# Patient Record
Sex: MALE | Race: WHITE | HISPANIC OR LATINO | Employment: OTHER | URBAN - METROPOLITAN AREA
[De-identification: names, ages, dates, MRNs, and addresses within clinical notes are randomized per-mention and may not be internally consistent; named-entity substitution may affect disease eponyms.]

---

## 2017-03-15 DIAGNOSIS — E11.69 HYPERLIPIDEMIA ASSOCIATED WITH TYPE 2 DIABETES MELLITUS: Chronic | ICD-10-CM

## 2017-03-15 DIAGNOSIS — E78.5 HYPERLIPIDEMIA ASSOCIATED WITH TYPE 2 DIABETES MELLITUS: Chronic | ICD-10-CM

## 2017-03-15 RX ORDER — PRAVASTATIN SODIUM 40 MG/1
TABLET ORAL
Qty: 90 TABLET | Refills: 3 | Status: SHIPPED | OUTPATIENT
Start: 2017-03-15 | End: 2018-03-24 | Stop reason: SDUPTHER

## 2017-03-24 ENCOUNTER — OFFICE VISIT (OUTPATIENT)
Dept: OPHTHALMOLOGY | Facility: CLINIC | Age: 72
End: 2017-03-24
Attending: OPHTHALMOLOGY
Payer: MEDICARE

## 2017-03-24 DIAGNOSIS — H40.1233 LOW-TENSION GLAUCOMA, BILATERAL, SEVERE STAGE: Primary | ICD-10-CM

## 2017-03-24 DIAGNOSIS — Z96.1 PSEUDOPHAKIA: ICD-10-CM

## 2017-03-24 DIAGNOSIS — H40.1233 LOW-TENSION GLAUCOMA, BILATERAL, SEVERE STAGE: ICD-10-CM

## 2017-03-24 DIAGNOSIS — H26.493 PCO (POSTERIOR CAPSULAR OPACIFICATION), BILATERAL: ICD-10-CM

## 2017-03-24 DIAGNOSIS — H11.001 PTERYGIUM EYE, RIGHT: ICD-10-CM

## 2017-03-24 DIAGNOSIS — H53.413 SCOTOMA, CENTRAL, BILATERAL: ICD-10-CM

## 2017-03-24 DIAGNOSIS — E11.9 DIABETES MELLITUS TYPE 2 WITHOUT RETINOPATHY: ICD-10-CM

## 2017-03-24 PROCEDURE — 92014 COMPRE OPH EXAM EST PT 1/>: CPT | Mod: S$GLB,,, | Performed by: OPHTHALMOLOGY

## 2017-03-24 PROCEDURE — 92133 CPTRZD OPH DX IMG PST SGM ON: CPT | Mod: S$GLB,,, | Performed by: OPHTHALMOLOGY

## 2017-03-24 PROCEDURE — 99499 UNLISTED E&M SERVICE: CPT | Mod: S$GLB,,, | Performed by: OPHTHALMOLOGY

## 2017-03-24 PROCEDURE — 99999 PR PBB SHADOW E&M-EST. PATIENT-LVL II: CPT | Mod: PBBFAC,,, | Performed by: OPHTHALMOLOGY

## 2017-03-24 PROCEDURE — 92083 EXTENDED VISUAL FIELD XM: CPT | Mod: S$GLB,,, | Performed by: OPHTHALMOLOGY

## 2017-03-24 NOTE — PROGRESS NOTES
HPI     DLS: 11/18/16    Pt here for HVF review;  Pt states at times the corner of his OD will itch.     Meds: Latanoprost qhs ou    1. Pterygium eye, right  2. Low tension glaucoma, bilateral, severe stage  3. PCO (posterior capsular opacification), bilateral  4. Diabetes mellitus type 2 without retinopathy  5. Scotoma, central, bilateral  6. Pseudophakia, both eyes        Last edited by Donna Matson on 3/24/2017  9:52 AM.         Assessment /Plan     For exam results, see Encounter Report.    Low-tension glaucoma, bilateral, severe stage    Pterygium eye, right    PCO (posterior capsular opacification), bilateral    Diabetes mellitus type 2 without retinopathy    Scotoma, central, bilateral    Pseudophakia - Both Eyes      LOST TO F/U 1/2014 TO 1/2016 - 2 YEARS     Low Tension Glaucoma - severe stage ou  Begun on gtts 1999  First [id number]  First photos 1999           Family history    neg        Glaucoma meds    Latanoprost ou - ( use to use trusopt ou bid and alphagan)        H/O adverse rxn to glaucoma drops    Intol to timolol - heart palpitations        LASERS    ALT OD 9/15/2003        GLAUCOMA SURGERIES    none        OTHER EYE SURGERIES    PC IOL od 1/28/2009 // os 11/6/2009        CDR    0.8 w/ inf pit / 0.7 w/ inf notch (h/o sup disc heme os 8/22/2008)        Tbase    16-20 ou          Tmax    20/20            Ttarget    14/14             HVF    15 test 2000 to 2017 OD: sup alt defect, split fixation; OS: SAD with split fixation        Gonio    +3 ou        CCT    553/555        OCT   5 tests  2006 to 2017 - OD:dec I kokod T  // OS:dec I kokod T         HRT    6 test 2004 to 2016 - MR -  Dec. N/I, hannah S od // dec. I, kokod T os /// CDR 0.74 od // 0.71 os        Disc photos    1999, 1005 - slides // 2008, 210, 2012, 2016  - OIS    - Ttoday   12/12  - Test done today    IOP , HVF/OCT     2.  VF defects arcuate w/ split fixation ou - 2/2 #1    3.  PCO ou -stable    + decrease in VA with BAT ou - monitor - can  have yag cap prn     4.  PC IOL OU       OD 1/28/2009        OS 11/6/2008     5. Pterygium OS - causing  a FB sensation - AT's prn    Also small one od - ?? Previous excision     6. DM- no DR on exam    PLAN  Stable exam  Cont  latanoprost, - IOP just at target of 14 ou   If needed can add dorzolamide back or brimonidine back   Also is a candidate for slt - H/O ALT OD 2003 - ?? Any effect     Can have yag cap prn - pt is not bothered by vision at this time     cont AT's  PRN    F/U 4 months with IOP check- other tests up to date // AR/MR/BAT    I have seen and personally examined the patient.  I agree with the findings, assessment and plan of the resident and/or fellow.     Lona Grace MD

## 2017-05-18 ENCOUNTER — LAB VISIT (OUTPATIENT)
Dept: LAB | Facility: HOSPITAL | Age: 72
End: 2017-05-18
Attending: INTERNAL MEDICINE
Payer: MEDICARE

## 2017-05-18 DIAGNOSIS — E11.69 HYPERLIPIDEMIA ASSOCIATED WITH TYPE 2 DIABETES MELLITUS: ICD-10-CM

## 2017-05-18 DIAGNOSIS — E11.9 CONTROLLED TYPE 2 DIABETES MELLITUS WITHOUT COMPLICATION, WITHOUT LONG-TERM CURRENT USE OF INSULIN: ICD-10-CM

## 2017-05-18 DIAGNOSIS — E78.5 HYPERLIPIDEMIA ASSOCIATED WITH TYPE 2 DIABETES MELLITUS: ICD-10-CM

## 2017-05-18 LAB
ALBUMIN SERPL BCP-MCNC: 4 G/DL
ALP SERPL-CCNC: 95 U/L
ALT SERPL W/O P-5'-P-CCNC: 27 U/L
ANION GAP SERPL CALC-SCNC: 7 MMOL/L
AST SERPL-CCNC: 28 U/L
BILIRUB SERPL-MCNC: 0.7 MG/DL
BUN SERPL-MCNC: 11 MG/DL
CALCIUM SERPL-MCNC: 9.7 MG/DL
CHLORIDE SERPL-SCNC: 97 MMOL/L
CHOLEST/HDLC SERPL: 2.3 {RATIO}
CO2 SERPL-SCNC: 28 MMOL/L
CREAT SERPL-MCNC: 0.9 MG/DL
EST. GFR  (AFRICAN AMERICAN): >60 ML/MIN/1.73 M^2
EST. GFR  (NON AFRICAN AMERICAN): >60 ML/MIN/1.73 M^2
GLUCOSE SERPL-MCNC: 118 MG/DL
HDL/CHOLESTEROL RATIO: 43.3 %
HDLC SERPL-MCNC: 210 MG/DL
HDLC SERPL-MCNC: 91 MG/DL
LDLC SERPL CALC-MCNC: 110.6 MG/DL
NONHDLC SERPL-MCNC: 119 MG/DL
POTASSIUM SERPL-SCNC: 4.4 MMOL/L
PROT SERPL-MCNC: 8.2 G/DL
SODIUM SERPL-SCNC: 132 MMOL/L
TRIGL SERPL-MCNC: 42 MG/DL

## 2017-05-18 PROCEDURE — 80053 COMPREHEN METABOLIC PANEL: CPT

## 2017-05-18 PROCEDURE — 36415 COLL VENOUS BLD VENIPUNCTURE: CPT

## 2017-05-18 PROCEDURE — 83036 HEMOGLOBIN GLYCOSYLATED A1C: CPT

## 2017-05-18 PROCEDURE — 80061 LIPID PANEL: CPT

## 2017-05-19 LAB
ESTIMATED AVG GLUCOSE: 120 MG/DL
HBA1C MFR BLD HPLC: 5.8 %

## 2017-06-06 DIAGNOSIS — I15.2 HYPERTENSION ASSOCIATED WITH DIABETES: Chronic | ICD-10-CM

## 2017-06-06 DIAGNOSIS — E11.59 HYPERTENSION ASSOCIATED WITH DIABETES: Chronic | ICD-10-CM

## 2017-06-06 RX ORDER — BENAZEPRIL HYDROCHLORIDE 20 MG/1
20 TABLET ORAL DAILY
Qty: 90 TABLET | Refills: 1 | Status: SHIPPED | OUTPATIENT
Start: 2017-06-06 | End: 2017-12-04 | Stop reason: SDUPTHER

## 2017-06-09 ENCOUNTER — OFFICE VISIT (OUTPATIENT)
Dept: INTERNAL MEDICINE | Facility: CLINIC | Age: 72
End: 2017-06-09
Payer: MEDICARE

## 2017-06-09 VITALS
SYSTOLIC BLOOD PRESSURE: 116 MMHG | HEIGHT: 66 IN | DIASTOLIC BLOOD PRESSURE: 64 MMHG | WEIGHT: 159.38 LBS | HEART RATE: 82 BPM | BODY MASS INDEX: 25.61 KG/M2

## 2017-06-09 DIAGNOSIS — E78.5 HYPERLIPIDEMIA ASSOCIATED WITH TYPE 2 DIABETES MELLITUS: ICD-10-CM

## 2017-06-09 DIAGNOSIS — R25.2 MUSCLE CRAMPS: ICD-10-CM

## 2017-06-09 DIAGNOSIS — Z23 NEED FOR TDAP VACCINATION: ICD-10-CM

## 2017-06-09 DIAGNOSIS — D75.839 THROMBOCYTOSIS: ICD-10-CM

## 2017-06-09 DIAGNOSIS — Z00.00 ANNUAL PHYSICAL EXAM: Primary | ICD-10-CM

## 2017-06-09 DIAGNOSIS — I15.2 HYPERTENSION ASSOCIATED WITH DIABETES: ICD-10-CM

## 2017-06-09 DIAGNOSIS — E87.1 HYPONATREMIA: ICD-10-CM

## 2017-06-09 DIAGNOSIS — E11.69 CONTROLLED TYPE 2 DIABETES MELLITUS WITH OTHER SPECIFIED COMPLICATION, WITHOUT LONG-TERM CURRENT USE OF INSULIN: Chronic | ICD-10-CM

## 2017-06-09 DIAGNOSIS — E11.59 HYPERTENSION ASSOCIATED WITH DIABETES: ICD-10-CM

## 2017-06-09 DIAGNOSIS — E11.42 DIABETIC POLYNEUROPATHY ASSOCIATED WITH TYPE 2 DIABETES MELLITUS: Chronic | ICD-10-CM

## 2017-06-09 DIAGNOSIS — E11.69 HYPERLIPIDEMIA ASSOCIATED WITH TYPE 2 DIABETES MELLITUS: ICD-10-CM

## 2017-06-09 DIAGNOSIS — R35.0 URINARY FREQUENCY: ICD-10-CM

## 2017-06-09 DIAGNOSIS — F10.20 UNCOMPLICATED ALCOHOL DEPENDENCE: ICD-10-CM

## 2017-06-09 PROCEDURE — 99397 PER PM REEVAL EST PAT 65+ YR: CPT | Mod: S$GLB,,, | Performed by: INTERNAL MEDICINE

## 2017-06-09 PROCEDURE — 99999 PR PBB SHADOW E&M-EST. PATIENT-LVL III: CPT | Mod: PBBFAC,,, | Performed by: INTERNAL MEDICINE

## 2017-06-09 PROCEDURE — 99499 UNLISTED E&M SERVICE: CPT | Mod: S$GLB,,, | Performed by: INTERNAL MEDICINE

## 2017-06-09 RX ORDER — CHLORHEXIDINE GLUCONATE ORAL RINSE 1.2 MG/ML
SOLUTION DENTAL
COMMUNITY
Start: 2017-03-09 | End: 2022-04-20

## 2017-06-09 RX ORDER — LATANOPROST 50 UG/ML
SOLUTION/ DROPS OPHTHALMIC
COMMUNITY
Start: 2017-05-11 | End: 2017-10-16 | Stop reason: SDUPTHER

## 2017-06-09 NOTE — PROGRESS NOTES
Subjective:       Patient ID: Yong Harmon is a 71 y.o. male.    Chief Complaint: Annual Exam    HPI    Last visit with me 03/2016. Over last year seen by Ophthalmology. Gets up 3-4x/night to urinate. Every so often with muscle cramps as well, not every night.    Reviewed PMH, PSH, SH, FH, allergies, and medications.     Review of Systems   All other systems reviewed and are negative.      Objective:      Physical Exam   Constitutional: He is oriented to person, place, and time. No distress.   HENT:   Head: Atraumatic.   Right Ear: Tympanic membrane is scarred. Tympanic membrane is not injected, not perforated and not erythematous.   Left Ear: Tympanic membrane normal.   Mouth/Throat: Oropharynx is clear and moist. No oropharyngeal exudate.   Eyes: Pupils are equal, round, and reactive to light. Right eye exhibits no discharge. Left eye exhibits no discharge.   Neck: Normal range of motion. No thyromegaly present.   Cardiovascular: Normal rate, regular rhythm and normal heart sounds.    Pulses:       Dorsalis pedis pulses are 2+ on the right side, and 2+ on the left side.        Posterior tibial pulses are 2+ on the right side, and 2+ on the left side.   Pulmonary/Chest: Effort normal and breath sounds normal. He has no wheezes. He has no rales.   Abdominal: Soft. He exhibits no distension and no mass. There is no hepatosplenomegaly. There is no tenderness. There is no rigidity, no guarding and negative Webber's sign.   Genitourinary:   Genitourinary Comments: Recommended GIGI to evaluate for urinary frequency, patient defers.   Musculoskeletal: He exhibits no edema or tenderness.        Right foot: There is deformity (hammertoe of 5th digit). There is normal range of motion.        Left foot: There is deformity (hammertoe of 5th digit). There is normal range of motion.   Feet:   Right Foot:   Protective Sensation: 5 sites tested. 5 sites sensed.   Skin Integrity: Negative for ulcer or skin breakdown.   Left Foot:  "  Protective Sensation: 5 sites tested. 5 sites sensed.   Skin Integrity: Negative for ulcer or skin breakdown.   Lymphadenopathy:     He has no cervical adenopathy.   Neurological: He is alert and oriented to person, place, and time.   Skin: Skin is warm and dry. No rash noted.   Psychiatric: He has a normal mood and affect. His behavior is normal.   Nursing note and vitals reviewed.      Vitals:    06/09/17 1556   BP: 116/64   BP Location: Right arm   Patient Position: Sitting   BP Method: Manual   Pulse: 82   Weight: 72.3 kg (159 lb 6.3 oz)   Height: 5' 6" (1.676 m)     Body mass index is 25.73 kg/m².    RESULTS: Reviewed labs from last 12 months    The 10-year ASCVD risk score (Christine LARKIN Jr., et al., 2013) is: 29.3%    Values used to calculate the score:      Age: 71 years      Sex: Male      Is Non- : No      Diabetic: Yes      Tobacco smoker: No      Systolic Blood Pressure: 116 mmHg      Is BP treated: Yes      HDL Cholesterol: 91 mg/dL      Total Cholesterol: 210 mg/dL     Assessment:       1. Annual physical exam    2. Need for Tdap vaccination    3. Uncomplicated alcohol dependence    4. Thrombocytosis    5. Controlled type 2 diabetes mellitus with other specified complication, without long-term current use of insulin    6. Hypertension associated with diabetes    7. Diabetic polyneuropathy associated with type 2 diabetes mellitus    8. Hyperlipidemia associated with type 2 diabetes mellitus    9. Hyponatremia    10. Urinary frequency    11. Muscle cramps        Plan:   Yong was seen today for annual exam.    Diagnoses and all orders for this visit:    Annual physical exam:  Age-appropriate health screening reviewed, indicated tests ordered.     Need for Tdap vaccination  -     diphth,pertus,acell,,tetanus (BOOSTRIX) 2.5-8-5 Lf-mcg-Lf/0.5mL Susp; Inject 0.5 mLs into the muscle once.    Uncomplicated alcohol dependence:  Pt reports not interfering with work or with relationships.  No " evidence of hepatitis on labs.  Discussed risk of electrolytes fluctuations and liver inflammation with heavy alcohol use.    Thrombocytosis:  Seen on prior labs, has been stable, continue to monitor.  Likely reactive thrombocytosis, possibly due to alcohol intake.  -     CBC Without Differential; Future    Controlled type 2 diabetes mellitus with other specified complication, without long-term current use of insulin:  Prior diagnosis, well controlled on current management. No changes at this time, will continue to monitor.   -     Basic metabolic panel; Future  -     Hemoglobin A1c; Future    Hypertension associated with diabetes:  Prior diagnosis, well controlled on current management. No changes at this time, will continue to monitor.     Diabetic polyneuropathy associated with type 2 diabetes mellitus:  No absence of sensation today, continue to use footwear regularly.  Continue control of diabetes.    Hyperlipidemia associated with type 2 diabetes mellitus:  Prior diagnosis, sufficiently controlled on current management. No changes at this time, will continue to monitor.     Hyponatremia:  Likely elevated ADH secondary to alcohol use. Asymptomatic, continue to monitor.  -     Basic metabolic panel; Future    Urinary frequency:  Defers GIGI today. May be due to alcohol use. Check PSA with next test.  -     Prostate Specific Antigen, Diagnostic; Future    Muscle cramps:  Sporadic, not every night, check electrolytes.  -     Magnesium; Future    Return in about 6 months (around 12/9/2017) for fasting labs 1 week prior.  Naresh Verdin MD  Internal Medicine    Portions of this note were completed using [employer] dictation software. Please excuse typographical or syntax errors.

## 2017-06-27 DIAGNOSIS — I15.2 HYPERTENSION ASSOCIATED WITH DIABETES: ICD-10-CM

## 2017-06-27 DIAGNOSIS — E11.59 HYPERTENSION ASSOCIATED WITH DIABETES: ICD-10-CM

## 2017-06-27 RX ORDER — AMLODIPINE BESYLATE 10 MG/1
10 TABLET ORAL DAILY
Qty: 90 TABLET | Refills: 0 | Status: SHIPPED | OUTPATIENT
Start: 2017-06-27 | End: 2017-09-23 | Stop reason: SDUPTHER

## 2017-07-21 ENCOUNTER — OFFICE VISIT (OUTPATIENT)
Dept: OPHTHALMOLOGY | Facility: CLINIC | Age: 72
End: 2017-07-21
Payer: MEDICARE

## 2017-07-21 DIAGNOSIS — E11.9 DIABETES MELLITUS TYPE 2 WITHOUT RETINOPATHY: ICD-10-CM

## 2017-07-21 DIAGNOSIS — H53.413 SCOTOMA, CENTRAL, BILATERAL: ICD-10-CM

## 2017-07-21 DIAGNOSIS — H40.1233 LOW-TENSION GLAUCOMA, BILATERAL, SEVERE STAGE: Primary | ICD-10-CM

## 2017-07-21 DIAGNOSIS — H11.001 PTERYGIUM EYE, RIGHT: ICD-10-CM

## 2017-07-21 DIAGNOSIS — H26.493 PCO (POSTERIOR CAPSULAR OPACIFICATION), BILATERAL: ICD-10-CM

## 2017-07-21 DIAGNOSIS — Z96.1 PSEUDOPHAKIA: ICD-10-CM

## 2017-07-21 PROCEDURE — 99999 PR PBB SHADOW E&M-EST. PATIENT-LVL II: CPT | Mod: PBBFAC,,, | Performed by: OPHTHALMOLOGY

## 2017-07-21 PROCEDURE — 92012 INTRM OPH EXAM EST PATIENT: CPT | Mod: S$GLB,,, | Performed by: OPHTHALMOLOGY

## 2017-07-21 NOTE — PROGRESS NOTES
HPI     DLS: 3/24/17      Pt here for IOP check     Pt has no VA complaints     Meds: Xalatan OU QHS    1. Pterygium eye, right  2. Low tension glaucoma, bilateral, severe stage  3. PCO (posterior capsular opacification), bilateral  4. Diabetes mellitus type 2 without retinopathy  5. Scotoma, central, bilateral  6. Pseudophakia, both eyes     Last edited by Viviana Bean on 7/21/2017 10:26 AM. (History)            Assessment /Plan     For exam results, see Encounter Report.    Low-tension glaucoma, bilateral, severe stage    Pterygium eye, right    PCO (posterior capsular opacification), bilateral    Diabetes mellitus type 2 without retinopathy    Scotoma, central, bilateral    Pseudophakia - Both Eyes          LOST TO F/U 1/2014 TO 1/2016 - 2 YEARS     Low Tension Glaucoma - severe stage ou  Begun on gtts 1999  First [id number]  First photos 1999           Family history    neg        Glaucoma meds    Latanoprost ou - ( use to use trusopt ou bid and alphagan)        H/O adverse rxn to glaucoma drops    Intol to timolol - heart palpitations        LASERS    ALT OD 9/15/2003        GLAUCOMA SURGERIES    none        OTHER EYE SURGERIES    PC IOL od 1/28/2009 // os 11/6/2009        CDR    0.8 w/ inf pit / 0.7 w/ inf notch (h/o sup disc heme os 8/22/2008)        Tbase    16-20 ou          Tmax    20/20            Ttarget    14/14             HVF    15 test 2000 to 2017 OD: sup alt defect, split fixation; OS: SAD with split fixation        Gonio    +3 ou        CCT    553/555        OCT   5 tests  2006 to 2017 - OD:dec I hannah T  // OS:dec I hannah T         HRT    6 test 2004 to 2016 -  -  Dec. N/I, hannah S od // dec. I, hannah T os /// CDR 0.74 od // 0.71 os        Disc photos    1999, 1005 - slides // 2008, 210, 2012, 2016  - OIS    - Ttoday   13/14  - Test done today    IOP     2.  VF defects arcuate w/ split fixation ou - 2/2 #1    3.  PCO ou -stable    + decrease in VA with BAT ou - monitor - can have yag cap prn     4.   PC IOL OU       OD 1/28/2009        OS 11/6/2008     5. Pterygium OS - causing  a FB sensation - AT's prn    Also small one od - ?? Previous excision     6. DM- no DR on exam    PLAN  Stable exam  Cont  latanoprost, - IOP just at target of 14 ou   If needed can add dorzolamide back or brimonidine back   Also is a candidate for slt - H/O ALT OD 2003 - ?? Any effect     Can have yag cap prn - pt is not bothered by vision at this time - monitor     cont AT's  PRN    F/U 4 months with HRT / Gonio    I have seen and personally examined the patient.  I agree with the findings, assessment and plan of the resident and/or fellow.     Lona Grace MD

## 2017-09-23 DIAGNOSIS — E11.59 HYPERTENSION ASSOCIATED WITH DIABETES: ICD-10-CM

## 2017-09-23 DIAGNOSIS — I15.2 HYPERTENSION ASSOCIATED WITH DIABETES: ICD-10-CM

## 2017-09-25 RX ORDER — AMLODIPINE BESYLATE 10 MG/1
10 TABLET ORAL DAILY
Qty: 90 TABLET | Refills: 3 | Status: SHIPPED | OUTPATIENT
Start: 2017-09-25 | End: 2018-06-15 | Stop reason: SDUPTHER

## 2017-10-16 RX ORDER — LATANOPROST 50 UG/ML
SOLUTION/ DROPS OPHTHALMIC
Qty: 7.5 ML | Refills: 3 | Status: SHIPPED | OUTPATIENT
Start: 2017-10-16 | End: 2018-11-11 | Stop reason: SDUPTHER

## 2017-12-04 DIAGNOSIS — E11.59 HYPERTENSION ASSOCIATED WITH DIABETES: Chronic | ICD-10-CM

## 2017-12-04 DIAGNOSIS — I15.2 HYPERTENSION ASSOCIATED WITH DIABETES: Chronic | ICD-10-CM

## 2017-12-05 RX ORDER — BENAZEPRIL HYDROCHLORIDE 20 MG/1
20 TABLET ORAL DAILY
Qty: 90 TABLET | Refills: 3 | Status: SHIPPED | OUTPATIENT
Start: 2017-12-05 | End: 2018-11-27 | Stop reason: SDUPTHER

## 2018-02-26 ENCOUNTER — PES CALL (OUTPATIENT)
Dept: ADMINISTRATIVE | Facility: CLINIC | Age: 73
End: 2018-02-26

## 2018-03-02 ENCOUNTER — CLINICAL SUPPORT (OUTPATIENT)
Dept: OPHTHALMOLOGY | Facility: CLINIC | Age: 73
End: 2018-03-02
Payer: MEDICARE

## 2018-03-02 ENCOUNTER — OFFICE VISIT (OUTPATIENT)
Dept: OPHTHALMOLOGY | Facility: CLINIC | Age: 73
End: 2018-03-02
Payer: MEDICARE

## 2018-03-02 DIAGNOSIS — H53.413 SCOTOMA, CENTRAL, BILATERAL: ICD-10-CM

## 2018-03-02 DIAGNOSIS — Z96.1 PSEUDOPHAKIA: ICD-10-CM

## 2018-03-02 DIAGNOSIS — H40.1233 LOW-TENSION GLAUCOMA, BILATERAL, SEVERE STAGE: ICD-10-CM

## 2018-03-02 DIAGNOSIS — H11.001 PTERYGIUM EYE, RIGHT: ICD-10-CM

## 2018-03-02 DIAGNOSIS — H40.1233 LOW-TENSION GLAUCOMA, BILATERAL, SEVERE STAGE: Primary | ICD-10-CM

## 2018-03-02 DIAGNOSIS — E11.9 DIABETES MELLITUS TYPE 2 WITHOUT RETINOPATHY: ICD-10-CM

## 2018-03-02 DIAGNOSIS — H26.493 PCO (POSTERIOR CAPSULAR OPACIFICATION), BILATERAL: ICD-10-CM

## 2018-03-02 PROCEDURE — 92020 GONIOSCOPY: CPT | Mod: S$GLB,,, | Performed by: OPHTHALMOLOGY

## 2018-03-02 PROCEDURE — 92012 INTRM OPH EXAM EST PATIENT: CPT | Mod: S$GLB,,, | Performed by: OPHTHALMOLOGY

## 2018-03-02 PROCEDURE — 99999 PR PBB SHADOW E&M-EST. PATIENT-LVL II: CPT | Mod: PBBFAC,,, | Performed by: OPHTHALMOLOGY

## 2018-03-02 PROCEDURE — 92134 CPTRZ OPH DX IMG PST SGM RTA: CPT | Mod: S$GLB,,, | Performed by: OPHTHALMOLOGY

## 2018-03-02 PROCEDURE — 99499 UNLISTED E&M SERVICE: CPT | Mod: S$GLB,,, | Performed by: OPHTHALMOLOGY

## 2018-03-02 NOTE — PROGRESS NOTES
Assessment /Plan     For exam results, see Encounter Report.    Low-tension glaucoma, bilateral, severe stage    Pterygium eye, right    PCO (posterior capsular opacification), bilateral    Scotoma, central, bilateral    Diabetes mellitus type 2 without retinopathy    Pseudophakia - Both Eyes            LOST TO F/U 1/2014 TO 1/2016 - 2 YEARS     Low Tension Glaucoma - severe stage ou  Begun on gtts 1999  First [id number]  First photos 1999           Family history    neg        Glaucoma meds    Latanoprost ou - ( use to use trusopt ou bid and alphagan)        H/O adverse rxn to glaucoma drops    Intol to timolol - heart palpitations        LASERS    ALT OD 9/15/2003        GLAUCOMA SURGERIES    none        OTHER EYE SURGERIES    PC IOL od 1/28/2009 // os 11/6/2009        CDR    0.8 w/ inf pit / 0.7 w/ inf notch (h/o sup disc heme os 8/22/2008)        Tbase    16-20 ou          Tmax    20/20            Ttarget    14/14             HVF    15 test 2000 to 2017 OD: sup alt defect, split fixation; OS: SAD with split fixation        Gonio    +3 ou        CCT    553/555        OCT   5 tests  2006 to 2017 - OD:dec I bord T  // OS:dec I bord T         HRT    6 test 2004 to 2016 -  -  Dec. N/I, bord S od // dec. I, bord T os /// CDR 0.74 od // 0.71 os        Disc photos    1999, 1005 - slides // 2008, 210, 2012, 2016  - OIS    - Ttoday   12/12  - Test done today    IOP // HRT / PCO check     2.  VF defects arcuate w/ split fixation ou - 2/2 #1    3.  PCO ou - Vis sign os    + decrease in VA with BAT ou - monitor - can have yag cap prn     4.  PC IOL OU       OD 1/28/2009        OS 11/6/2008     5. Pterygium OS - causing  a FB sensation - AT's prn    Also small one od - ?? Previous excision     6. DM- no DR on exam    PLAN  Stable exam  Cont  latanoprost, - IOP just at target of 14 ou   If needed can add dorzolamide back or brimonidine back   Also is a candidate for slt - H/O ALT OD 2003 - ?? Any effect     Can have yag  cap prn - pt is not bothered by vision at this time - monitor     cont AT's  PRN    F/U yag cap os     I have seen and personally examined the patient.  I agree with the findings, assessment and plan of the resident and/or fellow.     Lona Grace MD

## 2018-03-22 ENCOUNTER — OFFICE VISIT (OUTPATIENT)
Dept: OPHTHALMOLOGY | Facility: CLINIC | Age: 73
End: 2018-03-22
Payer: MEDICARE

## 2018-03-22 DIAGNOSIS — H11.001 PTERYGIUM EYE, RIGHT: ICD-10-CM

## 2018-03-22 DIAGNOSIS — Z96.1 PSEUDOPHAKIA: ICD-10-CM

## 2018-03-22 DIAGNOSIS — H26.493 PCO (POSTERIOR CAPSULAR OPACIFICATION), BILATERAL: ICD-10-CM

## 2018-03-22 DIAGNOSIS — H26.492 PCO (POSTERIOR CAPSULAR OPACIFICATION), LEFT: Primary | ICD-10-CM

## 2018-03-22 DIAGNOSIS — H53.413 SCOTOMA, CENTRAL, BILATERAL: ICD-10-CM

## 2018-03-22 DIAGNOSIS — E11.9 DIABETES MELLITUS TYPE 2 WITHOUT RETINOPATHY: ICD-10-CM

## 2018-03-22 DIAGNOSIS — H40.1233 LOW-TENSION GLAUCOMA, BILATERAL, SEVERE STAGE: ICD-10-CM

## 2018-03-22 PROCEDURE — 66821 AFTER CATARACT LASER SURGERY: CPT | Mod: LT,S$GLB,, | Performed by: OPHTHALMOLOGY

## 2018-03-22 PROCEDURE — 99499 UNLISTED E&M SERVICE: CPT | Mod: S$GLB,,, | Performed by: OPHTHALMOLOGY

## 2018-03-22 PROCEDURE — 99999 PR PBB SHADOW E&M-EST. PATIENT-LVL II: CPT | Mod: PBBFAC,,, | Performed by: OPHTHALMOLOGY

## 2018-03-24 DIAGNOSIS — E11.69 HYPERLIPIDEMIA ASSOCIATED WITH TYPE 2 DIABETES MELLITUS: Chronic | ICD-10-CM

## 2018-03-24 DIAGNOSIS — E78.5 HYPERLIPIDEMIA ASSOCIATED WITH TYPE 2 DIABETES MELLITUS: Chronic | ICD-10-CM

## 2018-03-25 NOTE — PROGRESS NOTES
HPI     DLS: 3/02/18    Pt here for YAG CAP OS  Pt states vision is cloudy.     Meds: Latanoprost qhs ou    1. Pterygium eye, right   2. Low tension glaucoma, bilateral, severe stage   3. PCO (posterior capsular opacification), bilateral   4. Diabetes mellitus type 2 without retinopathy   5. Scotoma, central, bilateral   6. Pseudophakia, both eyes        Last edited by Donna Matson on 3/22/2018 10:01 AM. (History)            Assessment /Plan     For exam results, see Encounter Report.    PCO (posterior capsular opacification), left    PCO (posterior capsular opacification), bilateral    Low-tension glaucoma, bilateral, severe stage    Scotoma, central, bilateral    Pterygium eye, right    Diabetes mellitus type 2 without retinopathy    Pseudophakia - Both Eyes          LOST TO F/U 1/2014 TO 1/2016 - 2 YEARS     Low Tension Glaucoma - severe stage ou  Begun on gtts 1999  First [id number]  First photos 1999           Family history    neg        Glaucoma meds    Latanoprost ou - ( use to use trusopt ou bid and alphagan)        H/O adverse rxn to glaucoma drops    Intol to timolol - heart palpitations        LASERS    ALT OD 9/15/2003        GLAUCOMA SURGERIES    none        OTHER EYE SURGERIES    PC IOL od 1/28/2009 // os 11/6/2009        CDR    0.8 w/ inf pit / 0.7 w/ inf notch (h/o sup disc heme os 8/22/2008)        Tbase    16-20 ou          Tmax    20/20            Ttarget    14/14             HVF    15 test 2000 to 2017 OD: sup alt defect, split fixation; OS: SAD with split fixation        Gonio    +3 ou        CCT    553/555        OCT   5 tests  2006 to 2017 - OD:dec I bord T  // OS:dec I kokod T         HRT    6 test 2004 to 2016 - MR -  Dec. N/I, hannah S od // dec. I, hannah T os /// CDR 0.74 od // 0.71 os        Disc photos    1999, 1005 - slides // 2008, 210, 2012, 2016  - OIS    - Ttoday   12/12  - Test done today    yag cap os     2.  VF defects arcuate w/ split fixation ou - 2/2 #1    3.  PCO ou - Vis sign os  "   + decrease in VA with BAT ou - monitor - can have yag cap prn     4.  PC IOL OU       OD 1/28/2009        OS 11/6/2008     5. Pterygium OS - causing  a FB sensation - AT's prn    Also small one od - ?? Previous excision     6. DM- no DR on exam    PLAN  Stable exam  Cont  latanoprost, - IOP just at target of 14 ou   If needed can add dorzolamide back or brimonidine back   Also is a candidate for slt - H/O ALT OD 2003 - ?? Any effect     Can have yag cap os - done - 3/22/2018 - steroid taper    Minimal PCO od at this time - monitor    cont AT's  PRN    F/U - 6 weeks for  AR/MR - post yag cap os - and DFE - check to see if blurred vision improved    -  may have a flap of capsuLe "standing up" in the visual axis -  may need a touch up      I have seen and personally examined the patient.  I agree with the findings, assessment and plan of the resident and/or fellow.     Lona Grace MD    "

## 2018-03-26 RX ORDER — PRAVASTATIN SODIUM 40 MG/1
TABLET ORAL
Qty: 90 TABLET | Refills: 3 | Status: SHIPPED | OUTPATIENT
Start: 2018-03-26 | End: 2019-02-24 | Stop reason: SDUPTHER

## 2018-05-25 ENCOUNTER — OFFICE VISIT (OUTPATIENT)
Dept: OPHTHALMOLOGY | Facility: CLINIC | Age: 73
End: 2018-05-25
Payer: MEDICARE

## 2018-05-25 DIAGNOSIS — H40.1233 LOW-TENSION GLAUCOMA, BILATERAL, SEVERE STAGE: Primary | ICD-10-CM

## 2018-05-25 DIAGNOSIS — H26.492 PCO (POSTERIOR CAPSULAR OPACIFICATION), LEFT: ICD-10-CM

## 2018-05-25 DIAGNOSIS — H11.001 PTERYGIUM EYE, RIGHT: ICD-10-CM

## 2018-05-25 DIAGNOSIS — Z96.1 PSEUDOPHAKIA: ICD-10-CM

## 2018-05-25 DIAGNOSIS — H53.413 SCOTOMA, CENTRAL, BILATERAL: ICD-10-CM

## 2018-05-25 DIAGNOSIS — E11.9 DIABETES MELLITUS TYPE 2 WITHOUT RETINOPATHY: ICD-10-CM

## 2018-05-25 PROCEDURE — 99999 PR PBB SHADOW E&M-EST. PATIENT-LVL I: CPT | Mod: PBBFAC,,, | Performed by: OPHTHALMOLOGY

## 2018-05-25 PROCEDURE — 99499 UNLISTED E&M SERVICE: CPT | Mod: S$PBB,,, | Performed by: OPHTHALMOLOGY

## 2018-05-25 PROCEDURE — 92012 INTRM OPH EXAM EST PATIENT: CPT | Mod: S$GLB,,, | Performed by: OPHTHALMOLOGY

## 2018-05-25 NOTE — PROGRESS NOTES
Assessment /Plan     For exam results, see Encounter Report.    Low-tension glaucoma, bilateral, severe stage    Scotoma, central, bilateral    PCO (posterior capsular opacification), left    Pterygium eye, right    Diabetes mellitus type 2 without retinopathy    Pseudophakia - Both Eyes        LOST TO F/U 1/2014 TO 1/2016 - 2 YEARS     Low Tension Glaucoma - severe stage ou  Begun on gtts 1999  First [id number]  First photos 1999           Family history    neg        Glaucoma meds    Latanoprost ou - ( use to use trusopt ou bid and alphagan)        H/O adverse rxn to glaucoma drops    Intol to timolol - heart palpitations        LASERS    ALT OD 9/15/2003 // yag cap os 3/22/2018         GLAUCOMA SURGERIES    none        OTHER EYE SURGERIES    PC IOL od 1/28/2009 // os 11/6/2009        CDR    0.8 w/ inf pit / 0.7 w/ inf notch (h/o sup disc heme os 8/22/2008)        Tbase    16-20 ou          Tmax    20/20            Ttarget    14/14             HVF    15 test 2000 to 2017 OD: sup alt defect, split fixation; OS: SAD with split fixation        Gonio    +3 ou        CCT    553/555        OCT   5 tests  2006 to 2017 - OD:dec I bord T  // OS:dec I kokod T         HRT    6 test 2004 to 2016 - MR -  Dec. N/I, bord S od // dec. I, kokod T os /// CDR 0.74 od // 0.71 os        Disc photos    1999, 1005 - slides // 2008, 210, 2012, 2016  - OIS    - Ttoday   12/12  - Test done today    yag cap os - F/U - good VA - 20/20 post yag     2.  VF defects arcuate w/ split fixation ou - 2/2 #1    3.  PCO ou - Vis sign os    + decrease in VA with BAT ou -    S/P yag cap os 3/22/2018    No need for yag cap od yet - cont to monitor     4.  PC IOL OU       OD 1/28/2009        OS 11/6/2008     5. Pterygium OS - causing  a FB sensation - AT's prn    Also small one od - ?? Previous excision     6. DM- no DR on exam    PLAN  Stable exam  Cont  latanoprost, - IOP just at target of 14 ou   If needed can add dorzolamide back or brimonidine  back   Also is a candidate for slt - H/O ALT OD 2003 - ?? Any effect   S/P  yag cap os - done - 3/22/2018 - states vsion better // shadow gone     Minimal PCO od at this time - monitor    cont AT's  PRN    F/U - 4 months with HVF // DFE / phtos     I have seen and personally examined the patient.  I agree with the findings, assessment and plan of the resident and/or fellow.     Lona Grace MD

## 2018-05-31 DIAGNOSIS — E11.9 TYPE 2 DIABETES MELLITUS WITHOUT COMPLICATION: ICD-10-CM

## 2018-06-15 DIAGNOSIS — E11.59 HYPERTENSION ASSOCIATED WITH DIABETES: ICD-10-CM

## 2018-06-15 DIAGNOSIS — I15.2 HYPERTENSION ASSOCIATED WITH DIABETES: ICD-10-CM

## 2018-06-18 RX ORDER — AMLODIPINE BESYLATE 10 MG/1
10 TABLET ORAL DAILY
Qty: 90 TABLET | Refills: 0 | Status: SHIPPED | OUTPATIENT
Start: 2018-06-18 | End: 2019-01-31 | Stop reason: SDUPTHER

## 2018-08-17 ENCOUNTER — PATIENT MESSAGE (OUTPATIENT)
Dept: INTERNAL MEDICINE | Facility: CLINIC | Age: 73
End: 2018-08-17

## 2018-08-20 ENCOUNTER — PES CALL (OUTPATIENT)
Dept: ADMINISTRATIVE | Facility: CLINIC | Age: 73
End: 2018-08-20

## 2018-10-11 ENCOUNTER — LAB VISIT (OUTPATIENT)
Dept: LAB | Facility: HOSPITAL | Age: 73
End: 2018-10-11
Attending: INTERNAL MEDICINE
Payer: MEDICARE

## 2018-10-11 ENCOUNTER — PATIENT MESSAGE (OUTPATIENT)
Dept: INTERNAL MEDICINE | Facility: CLINIC | Age: 73
End: 2018-10-11

## 2018-10-11 ENCOUNTER — OFFICE VISIT (OUTPATIENT)
Dept: INTERNAL MEDICINE | Facility: CLINIC | Age: 73
End: 2018-10-11
Payer: MEDICARE

## 2018-10-11 VITALS
DIASTOLIC BLOOD PRESSURE: 68 MMHG | SYSTOLIC BLOOD PRESSURE: 130 MMHG | BODY MASS INDEX: 25.08 KG/M2 | WEIGHT: 156.06 LBS | HEIGHT: 66 IN | HEART RATE: 76 BPM

## 2018-10-11 DIAGNOSIS — E11.40 TYPE 2 DIABETES MELLITUS WITH DIABETIC NEUROPATHY, WITHOUT LONG-TERM CURRENT USE OF INSULIN: ICD-10-CM

## 2018-10-11 DIAGNOSIS — N18.2 STAGE 2 CHRONIC KIDNEY DISEASE: ICD-10-CM

## 2018-10-11 DIAGNOSIS — H40.1233 LOW-TENSION GLAUCOMA OF BOTH EYES, SEVERE STAGE: ICD-10-CM

## 2018-10-11 DIAGNOSIS — E11.21 CONTROLLED TYPE 2 DIABETES MELLITUS WITH DIABETIC NEPHROPATHY, WITHOUT LONG-TERM CURRENT USE OF INSULIN: Chronic | ICD-10-CM

## 2018-10-11 DIAGNOSIS — R25.2 MUSCLE CRAMPS: ICD-10-CM

## 2018-10-11 DIAGNOSIS — E78.5 HYPERLIPIDEMIA ASSOCIATED WITH TYPE 2 DIABETES MELLITUS: ICD-10-CM

## 2018-10-11 DIAGNOSIS — D75.839 THROMBOCYTOSIS: ICD-10-CM

## 2018-10-11 DIAGNOSIS — E11.69 CONTROLLED TYPE 2 DIABETES MELLITUS WITH OTHER SPECIFIED COMPLICATION, WITHOUT LONG-TERM CURRENT USE OF INSULIN: Chronic | ICD-10-CM

## 2018-10-11 DIAGNOSIS — E11.9 TYPE 2 DIABETES MELLITUS WITHOUT COMPLICATION: ICD-10-CM

## 2018-10-11 DIAGNOSIS — Z00.00 ENCOUNTER FOR PREVENTIVE HEALTH EXAMINATION: Primary | ICD-10-CM

## 2018-10-11 DIAGNOSIS — F10.20 UNCOMPLICATED ALCOHOL DEPENDENCE: ICD-10-CM

## 2018-10-11 DIAGNOSIS — E11.69 HYPERLIPIDEMIA ASSOCIATED WITH TYPE 2 DIABETES MELLITUS: ICD-10-CM

## 2018-10-11 DIAGNOSIS — E11.59 HYPERTENSION ASSOCIATED WITH DIABETES: ICD-10-CM

## 2018-10-11 DIAGNOSIS — E11.42 DIABETIC POLYNEUROPATHY ASSOCIATED WITH TYPE 2 DIABETES MELLITUS: Chronic | ICD-10-CM

## 2018-10-11 DIAGNOSIS — E87.1 HYPONATREMIA: ICD-10-CM

## 2018-10-11 DIAGNOSIS — I15.2 HYPERTENSION ASSOCIATED WITH DIABETES: ICD-10-CM

## 2018-10-11 DIAGNOSIS — R35.0 URINARY FREQUENCY: ICD-10-CM

## 2018-10-11 LAB
ANION GAP SERPL CALC-SCNC: 8 MMOL/L
BUN SERPL-MCNC: 6 MG/DL
CALCIUM SERPL-MCNC: 9.4 MG/DL
CHLORIDE SERPL-SCNC: 103 MMOL/L
CHOLEST SERPL-MCNC: 195 MG/DL
CHOLEST/HDLC SERPL: 2.1 {RATIO}
CO2 SERPL-SCNC: 25 MMOL/L
COMPLEXED PSA SERPL-MCNC: 2 NG/ML
CREAT SERPL-MCNC: 0.9 MG/DL
ERYTHROCYTE [DISTWIDTH] IN BLOOD BY AUTOMATED COUNT: 12.5 %
EST. GFR  (AFRICAN AMERICAN): >60 ML/MIN/1.73 M^2
EST. GFR  (NON AFRICAN AMERICAN): >60 ML/MIN/1.73 M^2
ESTIMATED AVG GLUCOSE: 108 MG/DL
GLUCOSE SERPL-MCNC: 109 MG/DL
HBA1C MFR BLD HPLC: 5.4 %
HCT VFR BLD AUTO: 41.6 %
HDLC SERPL-MCNC: 91 MG/DL
HDLC SERPL: 46.7 %
HGB BLD-MCNC: 14.3 G/DL
LDLC SERPL CALC-MCNC: 93.6 MG/DL
MAGNESIUM SERPL-MCNC: 2.4 MG/DL
MCH RBC QN AUTO: 31.7 PG
MCHC RBC AUTO-ENTMCNC: 34.4 G/DL
MCV RBC AUTO: 92 FL
NONHDLC SERPL-MCNC: 104 MG/DL
PLATELET # BLD AUTO: 425 K/UL
PMV BLD AUTO: 8.2 FL
POTASSIUM SERPL-SCNC: 4.1 MMOL/L
RBC # BLD AUTO: 4.51 M/UL
SODIUM SERPL-SCNC: 136 MMOL/L
TRIGL SERPL-MCNC: 52 MG/DL
WBC # BLD AUTO: 6.15 K/UL

## 2018-10-11 PROCEDURE — 3044F HG A1C LEVEL LT 7.0%: CPT | Mod: CPTII,S$GLB,, | Performed by: NURSE PRACTITIONER

## 2018-10-11 PROCEDURE — 99214 OFFICE O/P EST MOD 30 MIN: CPT | Mod: PBBFAC | Performed by: NURSE PRACTITIONER

## 2018-10-11 PROCEDURE — 80061 LIPID PANEL: CPT

## 2018-10-11 PROCEDURE — 36415 COLL VENOUS BLD VENIPUNCTURE: CPT

## 2018-10-11 PROCEDURE — 80048 BASIC METABOLIC PNL TOTAL CA: CPT

## 2018-10-11 PROCEDURE — 85027 COMPLETE CBC AUTOMATED: CPT

## 2018-10-11 PROCEDURE — 99499 UNLISTED E&M SERVICE: CPT | Mod: S$GLB,,, | Performed by: NURSE PRACTITIONER

## 2018-10-11 PROCEDURE — 84153 ASSAY OF PSA TOTAL: CPT

## 2018-10-11 PROCEDURE — 99999 PR PBB SHADOW E&M-EST. PATIENT-LVL IV: CPT | Mod: PBBFAC,,, | Performed by: NURSE PRACTITIONER

## 2018-10-11 PROCEDURE — G0439 PPPS, SUBSEQ VISIT: HCPCS | Mod: S$GLB,,, | Performed by: NURSE PRACTITIONER

## 2018-10-11 PROCEDURE — 3078F DIAST BP <80 MM HG: CPT | Mod: CPTII,S$GLB,, | Performed by: NURSE PRACTITIONER

## 2018-10-11 PROCEDURE — 83036 HEMOGLOBIN GLYCOSYLATED A1C: CPT

## 2018-10-11 PROCEDURE — 83735 ASSAY OF MAGNESIUM: CPT

## 2018-10-11 PROCEDURE — 3075F SYST BP GE 130 - 139MM HG: CPT | Mod: CPTII,S$GLB,, | Performed by: NURSE PRACTITIONER

## 2018-10-11 RX ORDER — PNV NO.95/FERROUS FUM/FOLIC AC 28MG-0.8MG
100 TABLET ORAL DAILY
COMMUNITY

## 2018-10-11 RX ORDER — AMOXICILLIN 500 MG
1 CAPSULE ORAL DAILY
COMMUNITY

## 2018-10-11 RX ORDER — CHOLECALCIFEROL (VITAMIN D3) 25 MCG
1000 TABLET ORAL DAILY
COMMUNITY
End: 2018-10-18 | Stop reason: SDUPTHER

## 2018-10-11 RX ORDER — FERROUS SULFATE 325(65) MG
325 TABLET ORAL EVERY OTHER DAY
COMMUNITY
End: 2018-10-18

## 2018-10-11 NOTE — PATIENT INSTRUCTIONS
Counseling and Referral of Other Preventative  (Italic type indicates deductible and co-insurance are waived)    Patient Name: Yong Harmon  Today's Date: 10/11/2018    Health Maintenance       Date Due Completion Date    Influenza Vaccine 11/29/2018 (Originally 8/1/2018) 10/5/2016 (Declined)    Override on 10/5/2016: Declined    TETANUS VACCINE 10/11/2019 (Originally 9/21/1963) Need to obtain    Hemoglobin A1c 04/11/2019 10/11/2018 (Done)    Override on 10/11/2018: Done    Low Dose Statin 05/25/2019 5/25/2018    Eye Exam 05/25/2019 5/25/2018    Foot Exam 10/11/2019 10/11/2018 (Done)    Override on 10/11/2018: Done    Lipid Panel 10/11/2019 10/11/2018 (Done)    Override on 10/11/2018: Done    Colonoscopy 12/10/2024 12/10/2014        No orders of the defined types were placed in this encounter.    The following information is provided to all patients.  This information is to help you find resources for any of the problems found today that may be affecting your health:                Living healthy guide: www.[facility].[place].gov      Understanding Diabetes: www.diabetes.org      Eating healthy: www.cdc.gov/healthyweight      CDC home safety checklist: www.cdc.gov/steadi/patient.html      Agency on Aging: www.goea.[place].[facility]      Alcoholics anonymous (AA): www.aa.org      Physical Activity: www.devi.nih.gov/bk5ysdw      Tobacco use: www.quitwithusla.org

## 2018-10-11 NOTE — PROGRESS NOTES
"Yong Harmon presented for a  Medicare AWV and comprehensive Health Risk Assessment today. The following components were reviewed and updated:    · Medical history  · Family History  · Social history  · Allergies and Current Medications  · Health Risk Assessment  · Health Maintenance  · Care Team     ** See Completed Assessments for Annual Wellness Visit within the encounter summary.**       The following assessments were completed:  · Living Situation  · CAGE  · Depression Screening  · Timed Get Up and Go  · Whisper Test  · Cognitive Function Screening  ·   ·   ·   · Nutrition Screening  · ADL Screening  · PAQ Screening    Vitals:    10/11/18 0900   BP: 130/68   BP Location: Left arm   Pulse: 76   Weight: 70.8 kg (156 lb 1.4 oz)   Height: 5' 6" (1.676 m)     Body mass index is 25.19 kg/m².  Physical Exam   Constitutional: He is oriented to person, place, and time. He appears well-developed and well-nourished.   HENT:   Head: Normocephalic.   Cardiovascular: Normal rate, regular rhythm and intact distal pulses.   Pulses:       Dorsalis pedis pulses are 3+ on the right side, and 3+ on the left side.        Posterior tibial pulses are 2+ on the right side, and 2+ on the left side.   Pulmonary/Chest: Effort normal and breath sounds normal.   Abdominal: Soft. Bowel sounds are normal. He exhibits no mass.   Musculoskeletal: Normal range of motion. He exhibits no edema.        Right foot: There is normal range of motion and no deformity.        Left foot: There is normal range of motion and no deformity.   Feet:   Right Foot:   Protective Sensation: 7 sites tested. 7 sites sensed.   Skin Integrity: Negative for ulcer, blister, skin breakdown, erythema, warmth, callus or dry skin.   Left Foot:   Protective Sensation: 7 sites tested. 7 sites sensed.   Skin Integrity: Negative for ulcer, blister, skin breakdown, erythema, warmth or dry skin.   Neurological: He is alert and oriented to person, place, and time.   Skin: Skin is " warm and dry.   Psychiatric: He has a normal mood and affect.   Nursing note and vitals reviewed.        Diagnoses and health risks identified today and associated recommendations/orders:    1. Encounter for preventive health examination  Here for Health Risk Assessment/Annual Wellness Visit.  Health maintenance reviewed and updated. Follow up in one year.  Declined influenza vaccination. Reminder for TDAP given.    2. Hypertension associated with diabetes  Chronic, stable on current medications. Followed by PCP.    3. Hyperlipidemia associated with type 2 diabetes mellitus  Chronic, stable on current medications. Followed by PCP.    4. Controlled type 2 diabetes mellitus with diabetic nephropathy, without long-term current use of insulin  Chronic, stable on current medication. Followed by PCP.    5. Stage 2 chronic kidney disease  Chronic, mild, stable. GFR 84.  Followed by PCP.    6. Type 2 diabetes mellitus with diabetic neuropathy, without long-term current use of insulin  Chronic, stable on current medication. Followed by PCP.    7. Diabetic polyneuropathy associated with type 2 diabetes mellitus  Chronic, stable on current medication. Followed by PCP.    8. Thrombocytosis  Chronic, stable. Followed by PCP.    9. Uncomplicated alcohol dependence  Chronic, reports he consumes 5 lite beers daily. CAGE score 2/4. Followed by PCP.    10. Low-tension glaucoma of both eyes, severe stage  Chronic, stable on current medications. Followed by Ophthalmology      Provided Yong with a 5-10 year written screening schedule and personal prevention plan. Recommendations were developed using the USPSTF age appropriate recommendations. Education, counseling, and referrals were provided as needed. After Visit Summary printed and given to patient which includes a list of additional screenings\tests needed.    Follow-up in 7 days (on 10/18/2018).with PCP    Estela Gill NP

## 2018-10-18 ENCOUNTER — OFFICE VISIT (OUTPATIENT)
Dept: INTERNAL MEDICINE | Facility: CLINIC | Age: 73
End: 2018-10-18
Payer: MEDICARE

## 2018-10-18 VITALS
HEART RATE: 76 BPM | WEIGHT: 156.5 LBS | DIASTOLIC BLOOD PRESSURE: 62 MMHG | BODY MASS INDEX: 25.15 KG/M2 | HEIGHT: 66 IN | SYSTOLIC BLOOD PRESSURE: 116 MMHG

## 2018-10-18 DIAGNOSIS — E11.59 HYPERTENSION ASSOCIATED WITH DIABETES: ICD-10-CM

## 2018-10-18 DIAGNOSIS — E11.69 HYPERLIPIDEMIA ASSOCIATED WITH TYPE 2 DIABETES MELLITUS: ICD-10-CM

## 2018-10-18 DIAGNOSIS — E11.21 CONTROLLED TYPE 2 DIABETES MELLITUS WITH DIABETIC NEPHROPATHY, WITHOUT LONG-TERM CURRENT USE OF INSULIN: Chronic | ICD-10-CM

## 2018-10-18 DIAGNOSIS — E78.5 HYPERLIPIDEMIA ASSOCIATED WITH TYPE 2 DIABETES MELLITUS: ICD-10-CM

## 2018-10-18 DIAGNOSIS — Z00.00 ANNUAL PHYSICAL EXAM: Primary | ICD-10-CM

## 2018-10-18 DIAGNOSIS — I15.2 HYPERTENSION ASSOCIATED WITH DIABETES: ICD-10-CM

## 2018-10-18 PROCEDURE — 99213 OFFICE O/P EST LOW 20 MIN: CPT | Mod: PBBFAC | Performed by: INTERNAL MEDICINE

## 2018-10-18 PROCEDURE — 99499 UNLISTED E&M SERVICE: CPT | Mod: S$GLB,,, | Performed by: INTERNAL MEDICINE

## 2018-10-18 PROCEDURE — 3074F SYST BP LT 130 MM HG: CPT | Mod: CPTII,,, | Performed by: INTERNAL MEDICINE

## 2018-10-18 PROCEDURE — 99999 PR PBB SHADOW E&M-EST. PATIENT-LVL III: CPT | Mod: PBBFAC,,, | Performed by: INTERNAL MEDICINE

## 2018-10-18 PROCEDURE — 99397 PER PM REEVAL EST PAT 65+ YR: CPT | Mod: S$PBB,,, | Performed by: INTERNAL MEDICINE

## 2018-10-18 PROCEDURE — 3078F DIAST BP <80 MM HG: CPT | Mod: CPTII,,, | Performed by: INTERNAL MEDICINE

## 2018-10-18 PROCEDURE — 3044F HG A1C LEVEL LT 7.0%: CPT | Mod: CPTII,,, | Performed by: INTERNAL MEDICINE

## 2018-10-18 NOTE — PROGRESS NOTES
"Subjective:       Patient ID: Yong Harmon is a 73 y.o. male.    Chief Complaint: Annual Exam    HPI    Last visit with me 1 year ago.  Since then seen by Ophthalmology. Pt in his usual state of health, denies any problems or issues.    Reviewed PMH, PSH, SH, FH, allergies, and medications.     Review of Systems   All other systems reviewed and are negative.      Objective:      Physical Exam   Constitutional: No distress.   HENT:   Head: Atraumatic.   Right Ear: Tympanic membrane normal. No tenderness.   Left Ear: Tympanic membrane normal. No tenderness.   Mouth/Throat: Oropharynx is clear and moist. No oropharyngeal exudate.   Eyes: Pupils are equal, round, and reactive to light. Right eye exhibits no discharge. Left eye exhibits no discharge.   Neck: Normal range of motion. No thyromegaly present.   Cardiovascular: Normal rate, regular rhythm and normal heart sounds.   Pulmonary/Chest: Effort normal and breath sounds normal. No stridor. He has no wheezes. He has no rales.   Abdominal: Soft. There is no tenderness. There is no guarding.   Musculoskeletal: He exhibits no edema or tenderness.   Lymphadenopathy:     He has no cervical adenopathy.   Neurological: He is alert. He displays no tremor.   Skin: Skin is warm and dry. No rash noted.   Psychiatric: He has a normal mood and affect. His behavior is normal.   Nursing note and vitals reviewed.      Vitals:    10/18/18 1114   BP: 116/62   BP Location: Right arm   Patient Position: Sitting   BP Method: Large (Manual)   Pulse: 76   Weight: 71 kg (156 lb 8.4 oz)   Height: 5' 6" (1.676 m)     Body mass index is 25.26 kg/m².    RESULTS: Reviewed labs from last 12 months    Assessment:       1. Annual physical exam    2. Hyperlipidemia associated with type 2 diabetes mellitus    3. Hypertension associated with diabetes    4. Controlled type 2 diabetes mellitus with diabetic nephropathy, without long-term current use of insulin        Plan:   Yong was seen today for " annual exam.    Diagnoses and all orders for this visit:    Annual physical exam:  Age-appropriate health screening reviewed, indicated tests ordered.   -     Lipid panel; Future  -     Comprehensive metabolic panel; Future  -     CBC Without Differential; Future  -     Hemoglobin A1c; Future    Hyperlipidemia associated with type 2 diabetes mellitus:  Prior diagnosis, stable, well controlled on current management. No changes at this time, will continue to monitor.   -     Lipid panel; Future    Hypertension associated with diabetes:  Prior diagnosis, stable, well controlled on current management. No changes at this time, will continue to monitor.   -     Comprehensive metabolic panel; Future  -     CBC Without Differential; Future    Controlled type 2 diabetes mellitus with diabetic nephropathy, without long-term current use of insulin:  Prior diagnosis, stable, well controlled on current management. No changes at this time, will continue to monitor.   -     Hemoglobin A1c; Future    Follow-up in about 1 year (around 10/18/2019) for EPP annual exam, fasting labs 1 week prior.  Naresh Verdin MD  Internal Medicine    Portions of this note were completed using medical dictation software. Please excuse typographical or syntax errors that were missed on review.

## 2018-11-12 RX ORDER — LATANOPROST 50 UG/ML
SOLUTION/ DROPS OPHTHALMIC
Qty: 7.5 ML | Refills: 3 | Status: SHIPPED | OUTPATIENT
Start: 2018-11-12 | End: 2020-03-06 | Stop reason: SDUPTHER

## 2018-11-27 DIAGNOSIS — E11.59 HYPERTENSION ASSOCIATED WITH DIABETES: Chronic | ICD-10-CM

## 2018-11-27 DIAGNOSIS — I15.2 HYPERTENSION ASSOCIATED WITH DIABETES: Chronic | ICD-10-CM

## 2018-11-27 RX ORDER — BENAZEPRIL HYDROCHLORIDE 20 MG/1
20 TABLET ORAL DAILY
Qty: 90 TABLET | Refills: 3 | Status: SHIPPED | OUTPATIENT
Start: 2018-11-27 | End: 2019-11-21 | Stop reason: SDUPTHER

## 2019-01-31 DIAGNOSIS — E11.59 HYPERTENSION ASSOCIATED WITH DIABETES: ICD-10-CM

## 2019-01-31 DIAGNOSIS — I15.2 HYPERTENSION ASSOCIATED WITH DIABETES: ICD-10-CM

## 2019-01-31 RX ORDER — AMLODIPINE BESYLATE 10 MG/1
TABLET ORAL
Qty: 90 TABLET | Refills: 3 | Status: SHIPPED | OUTPATIENT
Start: 2019-01-31 | End: 2020-02-18

## 2019-02-24 DIAGNOSIS — E78.5 HYPERLIPIDEMIA ASSOCIATED WITH TYPE 2 DIABETES MELLITUS: Chronic | ICD-10-CM

## 2019-02-24 DIAGNOSIS — E11.69 HYPERLIPIDEMIA ASSOCIATED WITH TYPE 2 DIABETES MELLITUS: Chronic | ICD-10-CM

## 2019-02-25 RX ORDER — PRAVASTATIN SODIUM 40 MG/1
TABLET ORAL
Qty: 90 TABLET | Refills: 3 | Status: SHIPPED | OUTPATIENT
Start: 2019-02-25 | End: 2020-02-19

## 2019-10-24 ENCOUNTER — LAB VISIT (OUTPATIENT)
Dept: LAB | Facility: HOSPITAL | Age: 74
End: 2019-10-24
Attending: INTERNAL MEDICINE
Payer: MEDICARE

## 2019-10-24 ENCOUNTER — TELEPHONE (OUTPATIENT)
Dept: INTERNAL MEDICINE | Facility: CLINIC | Age: 74
End: 2019-10-24

## 2019-10-24 DIAGNOSIS — I15.2 HYPERTENSION ASSOCIATED WITH DIABETES: ICD-10-CM

## 2019-10-24 DIAGNOSIS — E11.21 CONTROLLED TYPE 2 DIABETES MELLITUS WITH DIABETIC NEPHROPATHY, WITHOUT LONG-TERM CURRENT USE OF INSULIN: Chronic | ICD-10-CM

## 2019-10-24 DIAGNOSIS — E78.5 HYPERLIPIDEMIA ASSOCIATED WITH TYPE 2 DIABETES MELLITUS: ICD-10-CM

## 2019-10-24 DIAGNOSIS — D69.1 THROMBOCYTOPATHIA: Primary | ICD-10-CM

## 2019-10-24 DIAGNOSIS — E11.69 HYPERLIPIDEMIA ASSOCIATED WITH TYPE 2 DIABETES MELLITUS: ICD-10-CM

## 2019-10-24 DIAGNOSIS — E11.59 HYPERTENSION ASSOCIATED WITH DIABETES: ICD-10-CM

## 2019-10-24 DIAGNOSIS — Z00.00 ANNUAL PHYSICAL EXAM: ICD-10-CM

## 2019-10-24 DIAGNOSIS — D75.839 THROMBOCYTOSIS: ICD-10-CM

## 2019-10-24 LAB
ALBUMIN SERPL BCP-MCNC: 4.2 G/DL (ref 3.5–5.2)
ALP SERPL-CCNC: 117 U/L (ref 55–135)
ALT SERPL W/O P-5'-P-CCNC: 40 U/L (ref 10–44)
ANION GAP SERPL CALC-SCNC: 9 MMOL/L (ref 8–16)
AST SERPL-CCNC: 42 U/L (ref 10–40)
BILIRUB SERPL-MCNC: 0.7 MG/DL (ref 0.1–1)
BUN SERPL-MCNC: 9 MG/DL (ref 8–23)
CALCIUM SERPL-MCNC: 10.1 MG/DL (ref 8.7–10.5)
CHLORIDE SERPL-SCNC: 98 MMOL/L (ref 95–110)
CHOLEST SERPL-MCNC: 210 MG/DL (ref 120–199)
CHOLEST/HDLC SERPL: 2 {RATIO} (ref 2–5)
CO2 SERPL-SCNC: 28 MMOL/L (ref 23–29)
CREAT SERPL-MCNC: 1 MG/DL (ref 0.5–1.4)
ERYTHROCYTE [DISTWIDTH] IN BLOOD BY AUTOMATED COUNT: 12.8 % (ref 11.5–14.5)
EST. GFR  (AFRICAN AMERICAN): >60 ML/MIN/1.73 M^2
EST. GFR  (NON AFRICAN AMERICAN): >60 ML/MIN/1.73 M^2
ESTIMATED AVG GLUCOSE: 105 MG/DL (ref 68–131)
FERRITIN SERPL-MCNC: 71 NG/ML (ref 20–300)
GLUCOSE SERPL-MCNC: 114 MG/DL (ref 70–110)
HBA1C MFR BLD HPLC: 5.3 % (ref 4–5.6)
HCT VFR BLD AUTO: 44.9 % (ref 40–54)
HDLC SERPL-MCNC: 104 MG/DL (ref 40–75)
HDLC SERPL: 49.5 % (ref 20–50)
HGB BLD-MCNC: 14.5 G/DL (ref 14–18)
IRON SERPL-MCNC: 98 UG/DL (ref 45–160)
LDLC SERPL CALC-MCNC: 95 MG/DL (ref 63–159)
MCH RBC QN AUTO: 31 PG (ref 27–31)
MCHC RBC AUTO-ENTMCNC: 32.3 G/DL (ref 32–36)
MCV RBC AUTO: 96 FL (ref 82–98)
NONHDLC SERPL-MCNC: 106 MG/DL
PLATELET # BLD AUTO: 524 K/UL (ref 150–350)
PMV BLD AUTO: 9.1 FL (ref 9.2–12.9)
POTASSIUM SERPL-SCNC: 4.3 MMOL/L (ref 3.5–5.1)
PROT SERPL-MCNC: 8.6 G/DL (ref 6–8.4)
RBC # BLD AUTO: 4.68 M/UL (ref 4.6–6.2)
SATURATED IRON: 21 % (ref 20–50)
SODIUM SERPL-SCNC: 135 MMOL/L (ref 136–145)
TOTAL IRON BINDING CAPACITY: 477 UG/DL (ref 250–450)
TRANSFERRIN SERPL-MCNC: 322 MG/DL (ref 200–375)
TRIGL SERPL-MCNC: 55 MG/DL (ref 30–150)
WBC # BLD AUTO: 7.51 K/UL (ref 3.9–12.7)

## 2019-10-24 PROCEDURE — 80053 COMPREHEN METABOLIC PANEL: CPT | Mod: HCNC

## 2019-10-24 PROCEDURE — 36415 COLL VENOUS BLD VENIPUNCTURE: CPT | Mod: HCNC

## 2019-10-24 PROCEDURE — 80061 LIPID PANEL: CPT | Mod: HCNC

## 2019-10-24 PROCEDURE — 83540 ASSAY OF IRON: CPT | Mod: HCNC

## 2019-10-24 PROCEDURE — 83036 HEMOGLOBIN GLYCOSYLATED A1C: CPT | Mod: HCNC

## 2019-10-24 PROCEDURE — 82728 ASSAY OF FERRITIN: CPT | Mod: HCNC

## 2019-10-24 PROCEDURE — 85027 COMPLETE CBC AUTOMATED: CPT | Mod: HCNC

## 2019-10-24 NOTE — TELEPHONE ENCOUNTER
Please call the lab to add on two labs: 1) Iron & TIBC; and 2) Ferritin.  Dx code ICD-10 D47.3.    (I have also placed lab orders in the system in case it needs to be linked)

## 2019-10-31 ENCOUNTER — IMMUNIZATION (OUTPATIENT)
Dept: PHARMACY | Facility: CLINIC | Age: 74
End: 2019-10-31
Payer: MEDICARE

## 2019-10-31 ENCOUNTER — OFFICE VISIT (OUTPATIENT)
Dept: INTERNAL MEDICINE | Facility: CLINIC | Age: 74
End: 2019-10-31
Payer: MEDICARE

## 2019-10-31 ENCOUNTER — TELEPHONE (OUTPATIENT)
Dept: HEMATOLOGY/ONCOLOGY | Facility: CLINIC | Age: 74
End: 2019-10-31

## 2019-10-31 VITALS
BODY MASS INDEX: 24.87 KG/M2 | HEART RATE: 98 BPM | OXYGEN SATURATION: 99 % | HEIGHT: 66 IN | WEIGHT: 154.75 LBS | SYSTOLIC BLOOD PRESSURE: 122 MMHG | DIASTOLIC BLOOD PRESSURE: 80 MMHG

## 2019-10-31 DIAGNOSIS — E11.40 TYPE 2 DIABETES MELLITUS WITH DIABETIC NEUROPATHY, WITHOUT LONG-TERM CURRENT USE OF INSULIN: ICD-10-CM

## 2019-10-31 DIAGNOSIS — E11.59 HYPERTENSION ASSOCIATED WITH DIABETES: ICD-10-CM

## 2019-10-31 DIAGNOSIS — E78.5 HYPERLIPIDEMIA ASSOCIATED WITH TYPE 2 DIABETES MELLITUS: ICD-10-CM

## 2019-10-31 DIAGNOSIS — G25.0 BENIGN ESSENTIAL TREMOR: ICD-10-CM

## 2019-10-31 DIAGNOSIS — N18.2 STAGE 2 CHRONIC KIDNEY DISEASE DUE TO TYPE 2 DIABETES MELLITUS: ICD-10-CM

## 2019-10-31 DIAGNOSIS — E11.22 STAGE 2 CHRONIC KIDNEY DISEASE DUE TO TYPE 2 DIABETES MELLITUS: ICD-10-CM

## 2019-10-31 DIAGNOSIS — E11.21 CONTROLLED TYPE 2 DIABETES MELLITUS WITH DIABETIC NEPHROPATHY, WITHOUT LONG-TERM CURRENT USE OF INSULIN: ICD-10-CM

## 2019-10-31 DIAGNOSIS — I15.2 HYPERTENSION ASSOCIATED WITH DIABETES: ICD-10-CM

## 2019-10-31 DIAGNOSIS — Z00.00 ANNUAL PHYSICAL EXAM: Primary | ICD-10-CM

## 2019-10-31 DIAGNOSIS — D75.839 THROMBOCYTOSIS: ICD-10-CM

## 2019-10-31 DIAGNOSIS — E11.42 DIABETIC POLYNEUROPATHY ASSOCIATED WITH TYPE 2 DIABETES MELLITUS: ICD-10-CM

## 2019-10-31 DIAGNOSIS — F10.20 UNCOMPLICATED ALCOHOL DEPENDENCE: ICD-10-CM

## 2019-10-31 DIAGNOSIS — E11.69 HYPERLIPIDEMIA ASSOCIATED WITH TYPE 2 DIABETES MELLITUS: ICD-10-CM

## 2019-10-31 PROCEDURE — 3074F SYST BP LT 130 MM HG: CPT | Mod: HCNC,CPTII,S$GLB, | Performed by: INTERNAL MEDICINE

## 2019-10-31 PROCEDURE — 3044F HG A1C LEVEL LT 7.0%: CPT | Mod: HCNC,CPTII,S$GLB, | Performed by: INTERNAL MEDICINE

## 2019-10-31 PROCEDURE — 99499 RISK ADDL DX/OHS AUDIT: ICD-10-PCS | Mod: HCNC,S$GLB,, | Performed by: INTERNAL MEDICINE

## 2019-10-31 PROCEDURE — 99397 PR PREVENTIVE VISIT,EST,65 & OVER: ICD-10-PCS | Mod: HCNC,S$GLB,, | Performed by: INTERNAL MEDICINE

## 2019-10-31 PROCEDURE — 3074F PR MOST RECENT SYSTOLIC BLOOD PRESSURE < 130 MM HG: ICD-10-PCS | Mod: HCNC,CPTII,S$GLB, | Performed by: INTERNAL MEDICINE

## 2019-10-31 PROCEDURE — 3079F PR MOST RECENT DIASTOLIC BLOOD PRESSURE 80-89 MM HG: ICD-10-PCS | Mod: HCNC,CPTII,S$GLB, | Performed by: INTERNAL MEDICINE

## 2019-10-31 PROCEDURE — 99999 PR PBB SHADOW E&M-EST. PATIENT-LVL IV: ICD-10-PCS | Mod: PBBFAC,HCNC,, | Performed by: INTERNAL MEDICINE

## 2019-10-31 PROCEDURE — 3079F DIAST BP 80-89 MM HG: CPT | Mod: HCNC,CPTII,S$GLB, | Performed by: INTERNAL MEDICINE

## 2019-10-31 PROCEDURE — 3044F PR MOST RECENT HEMOGLOBIN A1C LEVEL <7.0%: ICD-10-PCS | Mod: HCNC,CPTII,S$GLB, | Performed by: INTERNAL MEDICINE

## 2019-10-31 PROCEDURE — 99499 UNLISTED E&M SERVICE: CPT | Mod: HCNC,S$GLB,, | Performed by: INTERNAL MEDICINE

## 2019-10-31 PROCEDURE — 99999 PR PBB SHADOW E&M-EST. PATIENT-LVL IV: CPT | Mod: PBBFAC,HCNC,, | Performed by: INTERNAL MEDICINE

## 2019-10-31 PROCEDURE — 99397 PER PM REEVAL EST PAT 65+ YR: CPT | Mod: HCNC,S$GLB,, | Performed by: INTERNAL MEDICINE

## 2019-10-31 NOTE — PROGRESS NOTES
Subjective:       Patient ID: Yong Harmon is a 74 y.o. male.    Chief Complaint: Annual Exam    HPI    Last visit with me October 2018. Patient has upcoming visit with Ophthalmology.    Tremor that was exacerbated by caffeine. Stopped coffee and now resolved. No symptoms of hyper/hypoglycemia.    Still drinks alcohol, about 5 beers/day.    Reviewed PMH, PSH, SH, FH, allergies, and medications.     Review of Systems   Constitutional: Negative for activity change and unexpected weight change.   HENT: Negative for hearing loss, rhinorrhea and trouble swallowing.    Eyes: Negative for discharge and visual disturbance.   Respiratory: Negative for chest tightness and wheezing.    Cardiovascular: Negative for chest pain and palpitations.   Gastrointestinal: Negative for blood in stool, constipation, diarrhea and vomiting.   Endocrine: Negative for polydipsia and polyuria.   Genitourinary: Negative for difficulty urinating, hematuria and urgency.   Musculoskeletal: Negative for arthralgias, joint swelling and neck pain.   Neurological: Negative for weakness and headaches.   Psychiatric/Behavioral: Negative for confusion and dysphoric mood.       Objective:      Physical Exam   Constitutional: He is oriented to person, place, and time. No distress.   HENT:   Head: Atraumatic.   Right Ear: Tympanic membrane normal. No tenderness.   Left Ear: Tympanic membrane normal. No tenderness.   Mouth/Throat: Oropharynx is clear and moist. No oropharyngeal exudate.   Eyes: Pupils are equal, round, and reactive to light. Right eye exhibits no discharge. Left eye exhibits no discharge.   Neck: Normal range of motion. No thyromegaly present.   Cardiovascular: Normal rate, regular rhythm and normal heart sounds.   Pulses:       Dorsalis pedis pulses are 2+ on the right side, and 2+ on the left side.        Posterior tibial pulses are 2+ on the right side, and 2+ on the left side.   Pulmonary/Chest: Effort normal and breath sounds normal.  "No stridor. He has no wheezes. He has no rales.   Abdominal: Soft. He exhibits no distension and no mass. There is no hepatosplenomegaly. There is no tenderness. There is no guarding.   Musculoskeletal: He exhibits no edema or tenderness.        Right foot: There is deformity (lateral toe deformity, also prominent 5th metatarsal laterally). There is normal range of motion.        Left foot: There is deformity (lateral toe deformity, also prominent 5th metatarsal laterally). There is normal range of motion.   Feet:   Right Foot:   Protective Sensation: 5 sites tested. 4 sites sensed.   Skin Integrity: Positive for dry skin. Negative for ulcer, blister, skin breakdown, erythema, warmth or callus.   Left Foot:   Protective Sensation: 5 sites tested. 4 sites sensed.   Skin Integrity: Positive for dry skin. Negative for ulcer, blister, skin breakdown, erythema, warmth or callus.   Lymphadenopathy:     He has no cervical adenopathy.   Neurological: He is alert and oriented to person, place, and time.   Skin: Skin is warm and dry. No rash noted.   Psychiatric: He has a normal mood and affect. His behavior is normal.   Nursing note and vitals reviewed.      Vitals:    10/31/19 0940   BP: 122/80   BP Location: Right arm   Patient Position: Sitting   BP Method: Medium (Manual)   Pulse: 98   SpO2: 99%   Weight: 70.2 kg (154 lb 12.2 oz)   Height: 5' 6" (1.676 m)     Body mass index is 24.98 kg/m².    RESULTS: Reviewed labs from last 12 months    Assessment:       1. Annual physical exam    2. Type 2 diabetes mellitus with diabetic neuropathy, without long-term current use of insulin    3. Hyperlipidemia associated with type 2 diabetes mellitus    4. Controlled type 2 diabetes mellitus with diabetic nephropathy, without long-term current use of insulin    5. Uncomplicated alcohol dependence    6. Thrombocytosis    7. Hypertension associated with diabetes    8. Diabetic polyneuropathy associated with type 2 diabetes mellitus  "   9. Stage 2 chronic kidney disease due to type 2 diabetes mellitus    10. Benign essential tremor        Plan:   Yong was seen today for annual exam.    Diagnoses and all orders for this visit:    Annual physical exam:  Age-appropriate health screening reviewed, indicated tests ordered. Defer Shingrix to next visit.    Type 2 diabetes mellitus with diabetic neuropathy, without long-term current use of insulin:  Prior diagnosis, A1c stable, well controlled on current management. Still with some mild neuropathy of decreased sensation but no pain. No changes at this time, will continue to monitor.     Hyperlipidemia associated with type 2 diabetes mellitus:  Prior diagnosis, stable, well controlled on current management. No changes at this time, will continue to monitor.     Controlled type 2 diabetes mellitus with diabetic nephropathy, without long-term current use of insulin: Prior diagnosis, stable, well controlled on current management. No changes at this time, will continue to monitor.     Uncomplicated alcohol dependence:  Prior dx, still drinking 5 beers/day, denies problems. Will continue to discuss at future visits.    Thrombocytosis:  Prior dx, plt increasing, refer to Hematology for evaluation.  -     Ambulatory consult to Hematology    Hypertension associated with diabetes:  Prior diagnosis, stable, well controlled on current management. No changes at this time, will continue to monitor.     Diabetic polyneuropathy associated with type 2 diabetes mellitus:  Prior dx, still some mildly diminshed sensation but not severe, no pain. Continue yearly foot exam.    Stage 2 chronic kidney disease due to type 2 diabetes mellitus:  Prior dx, not severe, kidney function stable. Continue control of hypertension and diabetes mellitus.    Benign essential tremor:  New problem, exacerbated by caffeine, otherwise not bothersome. Please notify the office if the symptoms worsen.       Follow up in about 1 year (around  10/31/2020) for EPP annual exam, fasting labs 1 week prior.  Naresh Verdin MD  Internal Medicine    Portions of this note were completed using medical dictation software. Please excuse typographical or syntax errors that were missed on review.

## 2019-11-05 NOTE — PROGRESS NOTES
HPI     DLS: 5/25/18    Pt here for HVF review;    Meds: Latanoprost qhs ou     1. Pterygium eye, right   2. Low tension glaucoma, bilateral, severe stage   3. PCO (posterior capsular opacification), bilateral   4. Diabetes mellitus type 2 without retinopathy   5. Scotoma, central, bilateral   6. Pseudophakia, both eyes     Last edited by Donna Matson on 11/8/2019  3:07 PM. (History)          Assessment /Plan     For exam results, see Encounter Report.    Low-tension glaucoma, bilateral, severe stage  -     Suarez Visual Field - OU - Extended - Both Eyes  -     Color Fundus Photography - OU - Both Eyes    Scotoma, central, bilateral    Pterygium eye, right    Diabetes mellitus type 2 without retinopathy    PCO (posterior capsular opacification), bilateral    Pseudophakia - Both Eyes          LOST TO F/U 1/2014 TO 1/2016 - 2 YEARS     Low Tension Glaucoma - severe stage ou  Begun on gtts 1999  First [id number]  First photos 1999           Family history    neg        Glaucoma meds    Latanoprost ou - ( use to use trusopt ou bid and alphagan)        H/O adverse rxn to glaucoma drops    Intol to timolol - heart palpitations        LASERS    ALT OD 9/15/2003 // yag cap os 3/22/2018         GLAUCOMA SURGERIES    none        OTHER EYE SURGERIES    PC IOL od 1/28/2009 // os 11/6/2009        CDR    0.8 w/ inf pit / 0.7 w/ inf notch (h/o sup disc heme os 8/22/2008)        Tbase    16-20 ou          Tmax    20/20            Ttarget    14/14             HVF    16 test 2000 to 2019 OD: sup alt defect, split fixation; OS: SAD with split fixation (? prog os )         Gonio    +3 ou        CCT    553/555        OCT   5 tests  2006 to 2017 - OD:dec I hannah T  // OS:dec I hannah KELLY         HRT    6 test 2004 to 2016 - MR -  Dec. N/I, hannah MAHER od // dec. Ihannah os /// CDR 0.74 od // 0.71 os        Disc photos    1999, 1005 - slides // 2008, 210, 2012, 2016, 2019   - OIS    - Ttoday   14/14  - Test done today    hvf / dfe / PHOTOS      2.  VF defects arcuate w/ split fixation ou - 2/2 #1    3.  PCO ou - Vis sign os    + decrease in VA with BAT ou -    S/P yag cap os 3/22/2018    No need for yag cap od yet - cont to monitor- central vis axis still clear      4.  PC IOL OU       OD 1/28/2009        OS 11/6/2008     5. Pterygium OS - causing  a FB sensation - AT's prn    Also small one od - ?? Previous excision     6. DM- no DR on exam    PLAN  Stable exam  Cont  latanoprost, - IOP just at target of 14 ou   If needed can add dorzolamide back or brimonidine back   Also is a candidate for slt - H/O ALT OD 2003 - ?? Any effect   S/P  yag cap os - done - 3/22/2018 - states vsion better // shadow gone     Minimal PCO od at this time - monitor    cont AT's  PRN    F/U - 4 months with HRT     I have seen and personally examined the patient.  I agree with the findings, assessment and plan of the resident and/or fellow.     Lona Grace MD

## 2019-11-06 ENCOUNTER — PATIENT OUTREACH (OUTPATIENT)
Dept: ADMINISTRATIVE | Facility: OTHER | Age: 74
End: 2019-11-06

## 2019-11-08 ENCOUNTER — CLINICAL SUPPORT (OUTPATIENT)
Dept: OPHTHALMOLOGY | Facility: CLINIC | Age: 74
End: 2019-11-08
Payer: MEDICARE

## 2019-11-08 ENCOUNTER — OFFICE VISIT (OUTPATIENT)
Dept: OPHTHALMOLOGY | Facility: CLINIC | Age: 74
End: 2019-11-08
Payer: MEDICARE

## 2019-11-08 DIAGNOSIS — H26.493 PCO (POSTERIOR CAPSULAR OPACIFICATION), BILATERAL: ICD-10-CM

## 2019-11-08 DIAGNOSIS — E11.9 DIABETES MELLITUS TYPE 2 WITHOUT RETINOPATHY: ICD-10-CM

## 2019-11-08 DIAGNOSIS — H11.001 PTERYGIUM EYE, RIGHT: ICD-10-CM

## 2019-11-08 DIAGNOSIS — H40.1233 LOW-TENSION GLAUCOMA, BILATERAL, SEVERE STAGE: Primary | ICD-10-CM

## 2019-11-08 DIAGNOSIS — Z96.1 PSEUDOPHAKIA: ICD-10-CM

## 2019-11-08 DIAGNOSIS — H53.413 SCOTOMA, CENTRAL, BILATERAL: ICD-10-CM

## 2019-11-08 PROCEDURE — 92014 PR EYE EXAM, EST PATIENT,COMPREHESV: ICD-10-PCS | Mod: HCNC,S$GLB,, | Performed by: OPHTHALMOLOGY

## 2019-11-08 PROCEDURE — 99999 PR PBB SHADOW E&M-EST. PATIENT-LVL II: ICD-10-PCS | Mod: PBBFAC,HCNC,, | Performed by: OPHTHALMOLOGY

## 2019-11-08 PROCEDURE — 92083 HUMPHREY VISUAL FIELD - OU - BOTH EYES: ICD-10-PCS | Mod: HCNC,S$GLB,, | Performed by: OPHTHALMOLOGY

## 2019-11-08 PROCEDURE — 92250 COLOR FUNDUS PHOTOGRAPHY - OU - BOTH EYES: ICD-10-PCS | Mod: HCNC,S$GLB,, | Performed by: OPHTHALMOLOGY

## 2019-11-08 PROCEDURE — 92083 EXTENDED VISUAL FIELD XM: CPT | Mod: HCNC,S$GLB,, | Performed by: OPHTHALMOLOGY

## 2019-11-08 PROCEDURE — 92250 FUNDUS PHOTOGRAPHY W/I&R: CPT | Mod: HCNC,S$GLB,, | Performed by: OPHTHALMOLOGY

## 2019-11-08 PROCEDURE — 99499 RISK ADDL DX/OHS AUDIT: ICD-10-PCS | Mod: HCNC,S$GLB,, | Performed by: OPHTHALMOLOGY

## 2019-11-08 PROCEDURE — 99499 UNLISTED E&M SERVICE: CPT | Mod: HCNC,S$GLB,, | Performed by: OPHTHALMOLOGY

## 2019-11-08 PROCEDURE — 92014 COMPRE OPH EXAM EST PT 1/>: CPT | Mod: HCNC,S$GLB,, | Performed by: OPHTHALMOLOGY

## 2019-11-08 PROCEDURE — 99999 PR PBB SHADOW E&M-EST. PATIENT-LVL II: CPT | Mod: PBBFAC,HCNC,, | Performed by: OPHTHALMOLOGY

## 2019-11-12 ENCOUNTER — INITIAL CONSULT (OUTPATIENT)
Dept: HEMATOLOGY/ONCOLOGY | Facility: CLINIC | Age: 74
End: 2019-11-12
Payer: MEDICARE

## 2019-11-12 ENCOUNTER — LAB VISIT (OUTPATIENT)
Dept: LAB | Facility: HOSPITAL | Age: 74
End: 2019-11-12
Payer: MEDICARE

## 2019-11-12 VITALS
TEMPERATURE: 98 F | WEIGHT: 156.31 LBS | SYSTOLIC BLOOD PRESSURE: 128 MMHG | BODY MASS INDEX: 25.12 KG/M2 | OXYGEN SATURATION: 97 % | RESPIRATION RATE: 17 BRPM | HEART RATE: 90 BPM | HEIGHT: 66 IN | DIASTOLIC BLOOD PRESSURE: 76 MMHG

## 2019-11-12 DIAGNOSIS — D75.839 THROMBOCYTOSIS: Primary | ICD-10-CM

## 2019-11-12 DIAGNOSIS — D75.839 THROMBOCYTOSIS: ICD-10-CM

## 2019-11-12 LAB — FERRITIN SERPL-MCNC: 51 NG/ML (ref 20–300)

## 2019-11-12 PROCEDURE — 99999 PR PBB SHADOW E&M-EST. PATIENT-LVL IV: CPT | Mod: PBBFAC,HCNC,GC,

## 2019-11-12 PROCEDURE — 3074F PR MOST RECENT SYSTOLIC BLOOD PRESSURE < 130 MM HG: ICD-10-PCS | Mod: HCNC,CPTII,GC,S$GLB

## 2019-11-12 PROCEDURE — 99204 PR OFFICE/OUTPT VISIT, NEW, LEVL IV, 45-59 MIN: ICD-10-PCS | Mod: HCNC,GC,S$GLB,

## 2019-11-12 PROCEDURE — 81270 JAK2 GENE: CPT | Mod: HCNC

## 2019-11-12 PROCEDURE — 1101F PT FALLS ASSESS-DOCD LE1/YR: CPT | Mod: HCNC,CPTII,GC,S$GLB

## 2019-11-12 PROCEDURE — 1126F PR PAIN SEVERITY QUANTIFIED, NO PAIN PRESENT: ICD-10-PCS | Mod: HCNC,GC,S$GLB,

## 2019-11-12 PROCEDURE — 1126F AMNT PAIN NOTED NONE PRSNT: CPT | Mod: HCNC,GC,S$GLB,

## 2019-11-12 PROCEDURE — 3078F PR MOST RECENT DIASTOLIC BLOOD PRESSURE < 80 MM HG: ICD-10-PCS | Mod: HCNC,CPTII,GC,S$GLB

## 2019-11-12 PROCEDURE — 3078F DIAST BP <80 MM HG: CPT | Mod: HCNC,CPTII,GC,S$GLB

## 2019-11-12 PROCEDURE — 82728 ASSAY OF FERRITIN: CPT | Mod: HCNC

## 2019-11-12 PROCEDURE — 1159F MED LIST DOCD IN RCRD: CPT | Mod: HCNC,GC,S$GLB,

## 2019-11-12 PROCEDURE — 99204 OFFICE O/P NEW MOD 45 MIN: CPT | Mod: HCNC,GC,S$GLB,

## 2019-11-12 PROCEDURE — 81219 CALR GENE COM VARIANTS: CPT | Mod: HCNC

## 2019-11-12 PROCEDURE — 1159F PR MEDICATION LIST DOCUMENTED IN MEDICAL RECORD: ICD-10-PCS | Mod: HCNC,GC,S$GLB,

## 2019-11-12 PROCEDURE — 99999 PR PBB SHADOW E&M-EST. PATIENT-LVL IV: ICD-10-PCS | Mod: PBBFAC,HCNC,GC,

## 2019-11-12 PROCEDURE — 81403 MOPATH PROCEDURE LEVEL 4: CPT | Mod: HCNC

## 2019-11-12 PROCEDURE — 1101F PR PT FALLS ASSESS DOC 0-1 FALLS W/OUT INJ PAST YR: ICD-10-PCS | Mod: HCNC,CPTII,GC,S$GLB

## 2019-11-12 PROCEDURE — 81207 BCR/ABL1 GENE MINOR BP: CPT | Mod: HCNC

## 2019-11-12 PROCEDURE — 3074F SYST BP LT 130 MM HG: CPT | Mod: HCNC,CPTII,GC,S$GLB

## 2019-11-13 NOTE — PROGRESS NOTES
[facility]  [facility]  Hematology/Medical Oncology Clinic      PATIENT: Yong Harmon  MRN: [id number]  DATE: 11/13/2019    Diagnosis:   1. Thrombocytosis      Chief Complaint: Consult    Other MDs:  Naresh Verdin MD    Subjective:   Initial History: Mr. Harmon is a 74 y.o. male who presents to hematology for his history of thrombocytosis.    Previous medical history includes a remote smoking history, HTN and HLD.     Since 2008, he has noted to have an mildly elevated platelet count. Averages around 450-470 and his most recent plt count was noted to be 524 (10/25/19). Rest of his CBC is unremarkable, wbc/hgb normal.    CMP showing mild hyperglycemia and mildly elevated protein without kidney dysfunction.     He has no complaints, no family history of oncological or hematological malignancies to the best of his knowledge.      Denies history of unintentional weight loss, fever/chills, night sweats, change in appetite, fatigue, sob, chest pain or rashes. No hx of CVA or VTE. Not on hormone replacement therapy. Does not take a daily aspirin.     Past Medical History:   Diagnosis Date    Alcohol dependence     Diabetes mellitus type II     Diabetic polyneuropathy associated with type 2 diabetes mellitus 3/21/2016    Diverticulosis of colon 2014    Glaucoma     Hyperlipidemia     Hypertension     Stage 2 chronic kidney disease 10/11/2018    Type 2 diabetes mellitus with ophthalmic manifestations      Past Surgical History:   Procedure Laterality Date    CATARACT EXTRACTION W/  INTRAOCULAR LENS IMPLANT Bilateral 2009    OU ()    COLONOSCOPY      COLONOSCOPY  2014    Normal, repeat not needed for 10 years    EYE SURGERY  2011    Cataract removal     Family History   Problem Relation Age of Onset    Diabetes Maternal Uncle     Asthma Mother     No Known Problems Father     Heart defect Sister     Diabetes Brother     No Known Problems Son     Mental illness  Sister     Schizophrenia Sister     Diabetes Maternal Grandfather     Amblyopia Neg Hx     Blindness Neg Hx     Thyroid disease Neg Hx     Stroke Neg Hx     Retinal detachment Neg Hx     Macular degeneration Neg Hx     Cancer Neg Hx     Glaucoma Neg Hx       reports that he quit smoking about 31 years ago. He has a 0.50 pack-year smoking history. He has never used smokeless tobacco. He reports that he drinks about 5.0 standard drinks of alcohol per week. He reports that he does not use drugs.  Review of patient's allergies indicates:  No Known Allergies  Current Outpatient Medications   Medication Sig Dispense Refill    amLODIPine (NORVASC) 10 MG tablet TAKE 1 TABLET BY MOUTH EVERY DAY 90 tablet 3    ascorbic acid (VITAMIN C) 500 MG tablet Take 500 mg by mouth once daily.      benazepril (LOTENSIN) 20 MG tablet TAKE 1 TABLET (20 MG TOTAL) BY MOUTH ONCE DAILY. 90 tablet 3    CALCIUM CARBONATE/VITAMIN D3 (CALCIUM 500 + D ORAL) Take 1 tablet by mouth once daily.      chlorhexidine (PERIDEX) 0.12 % solution       cyanocobalamin (VITAMIN B-12) 100 MCG tablet Take 100 mcg by mouth once daily.      fish oil-omega-3 fatty acids 300-1,000 mg capsule Take by mouth once daily.      GLUC FALCON/CHONDRO FALCON A/VIT C/MN (GLUCOSAMINE 1500 COMPLEX ORAL) Take 1 tablet by mouth once daily.      latanoprost 0.005 % ophthalmic solution PLACE 1 DROP INTO BOTH EYES EVERY EVENING. THIS REPLACES THE DORZOLAMIDE EYE DROPS 7.5 mL 3    MULTIVIT-MIN/FA/LYCOPENE/LUT (CENTRUM SILVER ULTRA MEN'S ORAL) Take 1 tablet by mouth once daily.      pravastatin (PRAVACHOL) 40 MG tablet TAKE 1 TABLET (40 MG TOTAL) BY MOUTH ONCE DAILY. 90 tablet 3    vitamin E 100 UNIT capsule Take 100 Units by mouth once daily.       No current facility-administered medications for this visit.      Review of Systems   Constitutional: Negative for appetite change, chills, fatigue and unexpected weight change.   HENT: Negative for dental problem, mouth sores,  "nosebleeds, trouble swallowing and voice change.    Eyes: Negative for visual disturbance.   Respiratory: Negative for chest tightness and shortness of breath.    Cardiovascular: Negative for chest pain, palpitations and leg swelling.   Gastrointestinal: Negative for abdominal distention, abdominal pain, blood in stool, diarrhea, nausea and vomiting.   Endocrine: Negative for cold intolerance.   Genitourinary: Negative for hematuria.   Musculoskeletal: Negative for neck pain.   Skin: Negative for pallor and rash.   Allergic/Immunologic: Negative for immunocompromised state.   Neurological: Negative for dizziness, weakness and headaches.   Hematological: Negative for adenopathy. Does not bruise/bleed easily.   Psychiatric/Behavioral: Negative for agitation and behavioral problems.       ECOG Performance Status: 0    Objective:      Vitals:   Vitals:    11/12/19 1447   BP: 128/76   Pulse: 90   Resp: 17   Temp: 98.3 °F (36.8 °C)   SpO2: 97%   Weight: 70.9 kg (156 lb 4.9 oz)   Height: 5' 6" (1.676 m)     BMI: Body mass index is 25.23 kg/m².    Physical Exam   Constitutional: He is oriented to person, place, and time. He appears well-developed and well-nourished.   HENT:   Head: Normocephalic and atraumatic.   Eyes: Pupils are equal, round, and reactive to light. EOM are normal.   Neck: Normal range of motion. Neck supple.   Cardiovascular: Normal rate and regular rhythm.   Pulmonary/Chest: Effort normal and breath sounds normal. He has no wheezes.   Abdominal: Soft. Bowel sounds are normal. He exhibits no mass.   Musculoskeletal: Normal range of motion. He exhibits no edema.   Lymphadenopathy:     He has no cervical adenopathy.     He has no axillary adenopathy.        Right: No supraclavicular adenopathy present.        Left: No supraclavicular adenopathy present.   Neurological: He is alert and oriented to person, place, and time.   Skin: Skin is warm and dry.   Psychiatric: He has a normal mood and affect. His " behavior is normal.   Vitals reviewed.      Laboratory Data:  Lab Visit on 11/12/2019   Component Date Value Ref Range Status    MPNR  Specimen type 11/12/2019 blood   Final    Specimen Type, BCR/ABL 11/12/2019 Blood   Final    Ferritin 11/12/2019 51  20.0 - 300.0 ng/mL Final     Assessment:       1. Thrombocytosis        Hematological History:      NA    Plan:     Mr. Harmon presents to the hematology clinic with his long standing history of mostly stable, mild thrombocytosis. His most recent labs showed a mild increase from his baseline platelet count and he has never had a work up before for his seen thrombocytosis. He has no concerning features or complains. He has no family history of hematological problems. He does not smoke, drink or take HRT. He has no reported VTE/CVA history.     -ENDY reflex to CALR, MPL  -BCR/ABL PCR  -ferritin  -RTC pending results  -if results negative, follow up 6-12 months with PCP for routine CBC and plt monitoring     Mane Coker MD  Hematology/Medical Oncology Fellow  [phone] (pager)

## 2019-11-18 LAB
DIAGNOSTIC BCR/ABL 1 RESULT: NORMAL
NARRATIVE DIAGNOSTIC REPORT-IMP: NORMAL
SPECIMEN TYPE, BCR/ABL: NORMAL

## 2019-11-21 DIAGNOSIS — I15.2 HYPERTENSION ASSOCIATED WITH DIABETES: Chronic | ICD-10-CM

## 2019-11-21 DIAGNOSIS — E11.59 HYPERTENSION ASSOCIATED WITH DIABETES: Chronic | ICD-10-CM

## 2019-11-21 RX ORDER — BENAZEPRIL HYDROCHLORIDE 20 MG/1
20 TABLET ORAL DAILY
Qty: 90 TABLET | Refills: 3 | Status: SHIPPED | OUTPATIENT
Start: 2019-11-21 | End: 2020-11-08

## 2019-11-22 LAB
MPNR  FINAL DIAGNOSIS: NORMAL
MPNR  SPECIMEN TYPE: NORMAL
MPNR RESULT: NORMAL

## 2019-11-25 ENCOUNTER — PATIENT MESSAGE (OUTPATIENT)
Dept: HEMATOLOGY/ONCOLOGY | Facility: CLINIC | Age: 74
End: 2019-11-25

## 2019-12-20 ENCOUNTER — TELEPHONE (OUTPATIENT)
Dept: INTERNAL MEDICINE | Facility: CLINIC | Age: 74
End: 2019-12-20

## 2019-12-23 ENCOUNTER — OFFICE VISIT (OUTPATIENT)
Dept: INTERNAL MEDICINE | Facility: CLINIC | Age: 74
End: 2019-12-23
Payer: MEDICARE

## 2019-12-23 VITALS
BODY MASS INDEX: 24.55 KG/M2 | SYSTOLIC BLOOD PRESSURE: 110 MMHG | HEART RATE: 88 BPM | DIASTOLIC BLOOD PRESSURE: 62 MMHG | HEIGHT: 66 IN | WEIGHT: 152.75 LBS

## 2019-12-23 DIAGNOSIS — E11.59 HYPERTENSION ASSOCIATED WITH DIABETES: ICD-10-CM

## 2019-12-23 DIAGNOSIS — Z00.00 ENCOUNTER FOR PREVENTIVE HEALTH EXAMINATION: Primary | ICD-10-CM

## 2019-12-23 DIAGNOSIS — F10.20 UNCOMPLICATED ALCOHOL DEPENDENCE: ICD-10-CM

## 2019-12-23 DIAGNOSIS — E11.42 DIABETIC POLYNEUROPATHY ASSOCIATED WITH TYPE 2 DIABETES MELLITUS: Chronic | ICD-10-CM

## 2019-12-23 DIAGNOSIS — N18.2 STAGE 2 CHRONIC KIDNEY DISEASE DUE TO TYPE 2 DIABETES MELLITUS: ICD-10-CM

## 2019-12-23 DIAGNOSIS — E11.22 STAGE 2 CHRONIC KIDNEY DISEASE DUE TO TYPE 2 DIABETES MELLITUS: ICD-10-CM

## 2019-12-23 DIAGNOSIS — E78.5 HYPERLIPIDEMIA ASSOCIATED WITH TYPE 2 DIABETES MELLITUS: ICD-10-CM

## 2019-12-23 DIAGNOSIS — D75.839 THROMBOCYTOSIS: ICD-10-CM

## 2019-12-23 DIAGNOSIS — H40.1233 LOW-TENSION GLAUCOMA OF BOTH EYES, SEVERE STAGE: ICD-10-CM

## 2019-12-23 DIAGNOSIS — G25.0 BENIGN ESSENTIAL TREMOR: ICD-10-CM

## 2019-12-23 DIAGNOSIS — I15.2 HYPERTENSION ASSOCIATED WITH DIABETES: ICD-10-CM

## 2019-12-23 DIAGNOSIS — E11.40 TYPE 2 DIABETES MELLITUS WITH DIABETIC NEUROPATHY, WITHOUT LONG-TERM CURRENT USE OF INSULIN: ICD-10-CM

## 2019-12-23 DIAGNOSIS — Z74.09 OTHER REDUCED MOBILITY: ICD-10-CM

## 2019-12-23 DIAGNOSIS — E11.69 HYPERLIPIDEMIA ASSOCIATED WITH TYPE 2 DIABETES MELLITUS: ICD-10-CM

## 2019-12-23 PROCEDURE — 3078F DIAST BP <80 MM HG: CPT | Mod: HCNC,CPTII,S$GLB, | Performed by: NURSE PRACTITIONER

## 2019-12-23 PROCEDURE — 3078F PR MOST RECENT DIASTOLIC BLOOD PRESSURE < 80 MM HG: ICD-10-PCS | Mod: HCNC,CPTII,S$GLB, | Performed by: NURSE PRACTITIONER

## 2019-12-23 PROCEDURE — G0439 PR MEDICARE ANNUAL WELLNESS SUBSEQUENT VISIT: ICD-10-PCS | Mod: HCNC,S$GLB,, | Performed by: NURSE PRACTITIONER

## 2019-12-23 PROCEDURE — 99999 PR PBB SHADOW E&M-EST. PATIENT-LVL IV: CPT | Mod: PBBFAC,HCNC,, | Performed by: NURSE PRACTITIONER

## 2019-12-23 PROCEDURE — 3044F PR MOST RECENT HEMOGLOBIN A1C LEVEL <7.0%: ICD-10-PCS | Mod: HCNC,CPTII,S$GLB, | Performed by: NURSE PRACTITIONER

## 2019-12-23 PROCEDURE — 3044F HG A1C LEVEL LT 7.0%: CPT | Mod: HCNC,CPTII,S$GLB, | Performed by: NURSE PRACTITIONER

## 2019-12-23 PROCEDURE — 3074F SYST BP LT 130 MM HG: CPT | Mod: HCNC,CPTII,S$GLB, | Performed by: NURSE PRACTITIONER

## 2019-12-23 PROCEDURE — G0439 PPPS, SUBSEQ VISIT: HCPCS | Mod: HCNC,S$GLB,, | Performed by: NURSE PRACTITIONER

## 2019-12-23 PROCEDURE — 3074F PR MOST RECENT SYSTOLIC BLOOD PRESSURE < 130 MM HG: ICD-10-PCS | Mod: HCNC,CPTII,S$GLB, | Performed by: NURSE PRACTITIONER

## 2019-12-23 PROCEDURE — 99999 PR PBB SHADOW E&M-EST. PATIENT-LVL IV: ICD-10-PCS | Mod: PBBFAC,HCNC,, | Performed by: NURSE PRACTITIONER

## 2019-12-23 RX ORDER — FERROUS SULFATE 325(65) MG
325 TABLET, DELAYED RELEASE (ENTERIC COATED) ORAL
COMMUNITY
End: 2021-04-07

## 2019-12-23 RX ORDER — ASPIRIN 81 MG/1
81 TABLET ORAL DAILY
COMMUNITY

## 2019-12-23 NOTE — PATIENT INSTRUCTIONS
Counseling and Referral of Other Preventative  (Italic type indicates deductible and co-insurance are waived)    Patient Name: Yong Harmon  Today's Date: 12/23/2019    Health Maintenance       Date Due Completion Date    Shingles Vaccine (2 of 3) 05/16/2016 3/21/2016 -Obtain new shingles vaccine - SHINGRIX - when available    TETANUS VACCINE 02/06/2020 (Originally 9/21/1963) Consider obtaining    Lipid Panel 10/24/2020 10/24/2019    Override on 10/11/2018: Done    Hemoglobin A1c 10/24/2020 10/24/2019    Override on 10/11/2018: Done    Foot Exam 10/31/2020 10/31/2019    Override on 10/11/2018: Done    Eye Exam 11/08/2020 11/8/2019    Low Dose Statin 12/23/2020 12/23/2019    Colonoscopy 12/10/2024 12/10/2014        No orders of the defined types were placed in this encounter.    The following information is provided to all patients.  This information is to help you find resources for any of the problems found today that may be affecting your health:                Living healthy guide: www.[facility].[place].gov      Understanding Diabetes: www.diabetes.org      Eating healthy: www.cdc.gov/healthyweight      CDC home safety checklist: www.cdc.gov/steadi/patient.html      Agency on Aging: www.goea.[place].gov      Alcoholics anonymous (AA): www.aa.org      Physical Activity: www.devi.nih.gov/xf1ijnz      Tobacco use: www.quitwithusla.org

## 2019-12-23 NOTE — PROGRESS NOTES
"Yong Harmon presented for a  Medicare AWV and comprehensive Health Risk Assessment today. The following components were reviewed and updated:    · Medical history  · Family History  · Social history  · Allergies and Current Medications  · Health Risk Assessment  · Health Maintenance  · Care Team     ** See Completed Assessments for Annual Wellness Visit within the encounter summary.**       The following assessments were completed:  · Living Situation  · CAGE  · Depression Screening  · Timed Get Up and Go  · Whisper Test  · Cognitive Function Screening      ·   · Nutrition Screening  · ADL Screening  · PAQ Screening    Vitals:    12/23/19 1024   BP: 110/62   BP Location: Right arm   Pulse: 88   Weight: 69.3 kg (152 lb 12.5 oz)   Height: 5' 6" (1.676 m)     Body mass index is 24.66 kg/m².  Physical Exam   Constitutional: He is oriented to person, place, and time. He appears well-developed and well-nourished.   HENT:   Head: Normocephalic.   Cardiovascular: Normal rate and regular rhythm.   Pulmonary/Chest: Effort normal and breath sounds normal.   Abdominal: Soft. Bowel sounds are normal.   Musculoskeletal: Normal range of motion. He exhibits no edema.   Neurological: He is alert and oriented to person, place, and time.   Skin: Skin is warm and dry.   Psychiatric: He has a normal mood and affect.   Nursing note and vitals reviewed.        Diagnoses and health risks identified today and associated recommendations/orders:    1. Encounter for preventive health examination  Here for Health Risk Assessment/Annual Wellness Visit.  Health maintenance reviewed and updated. Follow up in one year.    2. Hypertension associated with diabetes  Chronic, stable on current medications. Followed by PCP.    3. Hyperlipidemia associated with type 2 diabetes mellitus  Chronic, stable on current medications. Followed by PCP.    4. Stage 2 chronic kidney disease due to type 2 diabetes mellitus  Chronic, mild, stable. Followed by " PCP.    5. Type 2 diabetes mellitus with diabetic neuropathy, without long-term current use of insulin  Chronic, stable with diet control. Last A1c 5.3. Followed by PCP.    6. Diabetic polyneuropathy associated with type 2 diabetes mellitus  Chronic, stable with diet control. Last A1c 5.3. Followed by PCP.    7. Benign essential tremor  Chronic, stable. Followed by PCP.    8. Uncomplicated alcohol dependence  Chronic, continues to drink 5 light beers daily. CAGE score 0. Followed by PCP.    9. Low-tension glaucoma of both eyes, severe stage  Chronic, stable on current medication. Followed by Ophthalmology.    10. Thrombocytosis  Chronic, stable. Followed by PCP    11. Other reduced mobility  Chronic, no reported falls, no use of assistive device. Followed by PCP.      Provided Yong with a 5-10 year written screening schedule and personal prevention plan. Recommendations were developed using the USPSTF age appropriate recommendations. Education, counseling, and referrals were provided as needed. After Visit Summary printed and given to patient which includes a list of additional screenings\tests needed.    Follow up in about 10 months (around 10/31/2020).with PCP.    Estela Gill NP

## 2020-02-16 DIAGNOSIS — E11.69 HYPERLIPIDEMIA ASSOCIATED WITH TYPE 2 DIABETES MELLITUS: Chronic | ICD-10-CM

## 2020-02-16 DIAGNOSIS — E78.5 HYPERLIPIDEMIA ASSOCIATED WITH TYPE 2 DIABETES MELLITUS: Chronic | ICD-10-CM

## 2020-02-19 RX ORDER — PRAVASTATIN SODIUM 40 MG/1
TABLET ORAL
Qty: 90 TABLET | Refills: 2 | Status: SHIPPED | OUTPATIENT
Start: 2020-02-19 | End: 2020-11-08

## 2020-02-19 NOTE — PROGRESS NOTES
Refill Authorization Note     is requesting a refill authorization.    Brief assessment and rationale for refill: APPROVE: prr                                         Comments:     Requested Prescriptions   Signed Prescriptions Disp Refills    pravastatin (PRAVACHOL) 40 MG tablet 90 tablet 2     Sig: TAKE 1 TABLET (40 MG TOTAL) BY MOUTH ONCE DAILY.       Cardiovascular:  Antilipid - Statins Passed - 2/16/2020  1:06 AM        Passed - Patient is at least 18 years old        Passed - Office visit in past 12 months or future 90 days.     Recent Outpatient Visits            1 month ago Encounter for preventive health examination    [facility] - Internal Medicine Estela Gill NP    3 months ago Low-tension glaucoma, bilateral, severe stage    [facility] - Ophthalmology Lona Grace MD    3 months ago Annual physical exam    [facility] - Internal Medicine Naresh Verdin MD    1 year ago Annual physical exam    [facility] - Internal Medicine Naresh Verdin MD    1 year ago Encounter for preventive health examination    [facility] - Internal Medicine Estela Gill NP          Future Appointments              In 2 weeks Lona Grace MD [facility] - Ophthalmology, [facility]                Passed - Lipid Panel completed in last 360 days     Lab Results   Component Value Date    CHOL 210 (H) 10/24/2019     (H) 10/24/2019    LDLCALC 95.0 10/24/2019    TRIG 55 10/24/2019             Passed - ALT is 94 or below and within 360 days     ALT   Date Value Ref Range Status   10/24/2019 40 10 - 44 U/L Final   05/18/2017 27 10 - 44 U/L Final   03/21/2016 18 10 - 44 U/L Final              Passed - AST is 54 or below and within 360 days     AST   Date Value Ref Range Status   10/24/2019 42 (H) 10 - 40 U/L Final   05/18/2017 28 10 - 40 U/L Final   03/21/2016 20 10 - 40 U/L Final               Appointments  past 12m or future 3m with PCP    Date Provider   Last Visit   10/31/2019 Naresh Verdin MD    Next Visit   Visit date not found Naresh Verdin MD   .  ED visits in past 90 days: 0       Note composed:9:58 AM 02/19/2020

## 2020-03-04 NOTE — PROGRESS NOTES
HPI     Glaucoma      Additional comments: HRT review today              Comments     DLS: 11/8/19    1. Severe LTG OU  2. VF Defects  OU  3. Pterygium OS  4. PCO OD  5. PCIOL OU  6. Type 2 DM no   7. Yag Cap OS    MEDS:  Latanoprost QHS OU          Last edited by Chantel Weinstein MA on 3/6/2020  3:03 PM. (History)            Assessment /Plan     For exam results, see Encounter Report.    Low-tension glaucoma of both eyes, severe stage    Bilateral posterior capsular opacification    Pterygium of both eyes - Both Eyes    Hypertension associated with diabetes    Type 2 diabetes mellitus with diabetic neuropathy, unspecified whether long term insulin use    Pseudophakia - Both Eyes          LOST TO F/U 1/2014 TO 1/2016 - 2 YEARS     Low Tension Glaucoma - severe stage ou  Begun on gtts 1999  First [id number]  First photos 1999           Family history    neg        Glaucoma meds    Latanoprost ou - ( use to use trusopt ou bid and alphagan)        H/O adverse rxn to glaucoma drops    Intol to timolol - heart palpitations        LASERS    ALT OD 9/15/2003 // yag cap os 3/22/2018         GLAUCOMA SURGERIES    none        OTHER EYE SURGERIES    PC IOL od 1/28/2009 // os 11/6/2009        CDR    0.8 w/ inf pit / 0.7 w/ inf notch (h/o sup disc heme os 8/22/2008)        Tbase    16-20 ou          Tmax    20/20            Ttarget    14/14             HVF    16 test 2000 to 2019 OD: sup alt defect, split fixation; OS: SAD with split fixation (? prog os )         Gonio    +3 ou        CCT    553/555        OCT   5 tests  2006 to 2017 - OD:dec I okkod T  // OS:dec I hannah T         HRT    7 test 2004 to 2020 -  -  Dec. S/I od // dec. I, hannah KELLY os /// CDR 0.75 od // 0.73 os        Disc photos    1999, 1005 - slides // 2008, 210, 2012, 2016, 2019   - OIS    - Ttoday   16/18  - Test done today    hrt    2.  VF defects arcuate w/ split fixation ou - 2/2 #1    3.  PCO ou - Vis sign os    + decrease in VA with BAT ou -    S/P yag cap os  3/22/2018    No need for yag cap od yet - cont to monitor- central vis axis still clear      4.  PC IOL OU       OD 1/28/2009        OS 11/6/2008     5. Pterygium OS - causing  a FB sensation - AT's prn    Also small one od - ?? Previous excision     6. DM- no DR on exam    PLAN  Stable exam  Above tarte ou   Cont  latanoprost, -   Add back brimonidine tid ou   If needed can add dorzolamide  Also is a candidate for slt - H/O ALT OD 2003 - ?? Any effect   S/P  yag cap os - done - 3/22/2018 - states vsion better // shadow gone     Minimal PCO od at this time - monitor    cont AT's  PRN    F/U - 4 months with gonio and IOP check with addition of brimonidine / if IOP above target consider SLT     I have seen and personally examined the patient.  I agree with the findings, assessment and plan of the resident and/or fellow.     Lona Grace MD

## 2020-03-05 ENCOUNTER — PATIENT OUTREACH (OUTPATIENT)
Dept: ADMINISTRATIVE | Facility: OTHER | Age: 75
End: 2020-03-05

## 2020-03-06 ENCOUNTER — OFFICE VISIT (OUTPATIENT)
Dept: OPHTHALMOLOGY | Facility: CLINIC | Age: 75
End: 2020-03-06
Payer: MEDICARE

## 2020-03-06 DIAGNOSIS — I15.2 HYPERTENSION ASSOCIATED WITH DIABETES: ICD-10-CM

## 2020-03-06 DIAGNOSIS — H11.003 PTERYGIUM OF BOTH EYES: ICD-10-CM

## 2020-03-06 DIAGNOSIS — E11.40 TYPE 2 DIABETES MELLITUS WITH DIABETIC NEUROPATHY, UNSPECIFIED WHETHER LONG TERM INSULIN USE: ICD-10-CM

## 2020-03-06 DIAGNOSIS — H26.493 BILATERAL POSTERIOR CAPSULAR OPACIFICATION: ICD-10-CM

## 2020-03-06 DIAGNOSIS — H40.1233 LOW-TENSION GLAUCOMA OF BOTH EYES, SEVERE STAGE: Primary | ICD-10-CM

## 2020-03-06 DIAGNOSIS — Z96.1 PSEUDOPHAKIA: ICD-10-CM

## 2020-03-06 DIAGNOSIS — E11.59 HYPERTENSION ASSOCIATED WITH DIABETES: ICD-10-CM

## 2020-03-06 PROCEDURE — 99999 PR PBB SHADOW E&M-EST. PATIENT-LVL II: ICD-10-PCS | Mod: PBBFAC,HCNC,, | Performed by: OPHTHALMOLOGY

## 2020-03-06 PROCEDURE — 99999 PR PBB SHADOW E&M-EST. PATIENT-LVL II: CPT | Mod: PBBFAC,HCNC,, | Performed by: OPHTHALMOLOGY

## 2020-03-06 PROCEDURE — 99499 UNLISTED E&M SERVICE: CPT | Mod: HCNC,S$GLB,, | Performed by: OPHTHALMOLOGY

## 2020-03-06 PROCEDURE — 92133 CPTRZD OPH DX IMG PST SGM ON: CPT | Mod: HCNC,S$GLB,, | Performed by: OPHTHALMOLOGY

## 2020-03-06 PROCEDURE — 92012 PR EYE EXAM, EST PATIENT,INTERMED: ICD-10-PCS | Mod: HCNC,S$GLB,, | Performed by: OPHTHALMOLOGY

## 2020-03-06 PROCEDURE — 99499 RISK ADDL DX/OHS AUDIT: ICD-10-PCS | Mod: HCNC,S$GLB,, | Performed by: OPHTHALMOLOGY

## 2020-03-06 PROCEDURE — 92133 HEIDELBERG RETINA TOMOGRAPHY (HRT) - OU - BOTH EYES: ICD-10-PCS | Mod: HCNC,S$GLB,, | Performed by: OPHTHALMOLOGY

## 2020-03-06 PROCEDURE — 92012 INTRM OPH EXAM EST PATIENT: CPT | Mod: HCNC,S$GLB,, | Performed by: OPHTHALMOLOGY

## 2020-03-06 RX ORDER — LATANOPROST 50 UG/ML
1 SOLUTION/ DROPS OPHTHALMIC NIGHTLY
Qty: 3 BOTTLE | Refills: 3 | Status: SHIPPED | OUTPATIENT
Start: 2020-03-06 | End: 2020-12-21 | Stop reason: SDUPTHER

## 2020-03-06 RX ORDER — BRIMONIDINE TARTRATE 2 MG/ML
1 SOLUTION/ DROPS OPHTHALMIC 3 TIMES DAILY
Qty: 15 ML | Refills: 3 | Status: SHIPPED | OUTPATIENT
Start: 2020-03-06 | End: 2021-02-18

## 2020-04-23 ENCOUNTER — PATIENT MESSAGE (OUTPATIENT)
Dept: ADMINISTRATIVE | Facility: OTHER | Age: 75
End: 2020-04-23

## 2020-07-31 ENCOUNTER — PATIENT OUTREACH (OUTPATIENT)
Dept: ADMINISTRATIVE | Facility: OTHER | Age: 75
End: 2020-07-31

## 2020-08-03 ENCOUNTER — OFFICE VISIT (OUTPATIENT)
Dept: OPHTHALMOLOGY | Facility: CLINIC | Age: 75
End: 2020-08-03
Payer: MEDICARE

## 2020-08-03 DIAGNOSIS — H40.1233 LOW-TENSION GLAUCOMA OF BOTH EYES, SEVERE STAGE: Primary | ICD-10-CM

## 2020-08-03 DIAGNOSIS — Z96.1 PSEUDOPHAKIA: ICD-10-CM

## 2020-08-03 DIAGNOSIS — E11.40 TYPE 2 DIABETES MELLITUS WITH DIABETIC NEUROPATHY, UNSPECIFIED WHETHER LONG TERM INSULIN USE: ICD-10-CM

## 2020-08-03 DIAGNOSIS — H26.493 BILATERAL POSTERIOR CAPSULAR OPACIFICATION: ICD-10-CM

## 2020-08-03 DIAGNOSIS — I15.2 HYPERTENSION ASSOCIATED WITH DIABETES: ICD-10-CM

## 2020-08-03 DIAGNOSIS — H11.003 PTERYGIUM OF BOTH EYES: ICD-10-CM

## 2020-08-03 DIAGNOSIS — E11.59 HYPERTENSION ASSOCIATED WITH DIABETES: ICD-10-CM

## 2020-08-03 PROCEDURE — 92020 PR SPECIAL EYE EVAL,GONIOSCOPY: ICD-10-PCS | Mod: HCNC,S$GLB,, | Performed by: OPHTHALMOLOGY

## 2020-08-03 PROCEDURE — 92012 PR EYE EXAM, EST PATIENT,INTERMED: ICD-10-PCS | Mod: HCNC,S$GLB,, | Performed by: OPHTHALMOLOGY

## 2020-08-03 PROCEDURE — 99499 UNLISTED E&M SERVICE: CPT | Mod: HCNC,S$GLB,, | Performed by: OPHTHALMOLOGY

## 2020-08-03 PROCEDURE — 92012 INTRM OPH EXAM EST PATIENT: CPT | Mod: HCNC,S$GLB,, | Performed by: OPHTHALMOLOGY

## 2020-08-03 PROCEDURE — 99499 RISK ADDL DX/OHS AUDIT: ICD-10-PCS | Mod: HCNC,S$GLB,, | Performed by: OPHTHALMOLOGY

## 2020-08-03 PROCEDURE — 92020 GONIOSCOPY: CPT | Mod: HCNC,S$GLB,, | Performed by: OPHTHALMOLOGY

## 2020-08-03 PROCEDURE — 99999 PR PBB SHADOW E&M-EST. PATIENT-LVL III: CPT | Mod: PBBFAC,HCNC,, | Performed by: OPHTHALMOLOGY

## 2020-08-03 PROCEDURE — 99999 PR PBB SHADOW E&M-EST. PATIENT-LVL III: ICD-10-PCS | Mod: PBBFAC,HCNC,, | Performed by: OPHTHALMOLOGY

## 2020-08-03 NOTE — PROGRESS NOTES
HPI     Glaucoma      Additional comments: 4 month ck and pt states no changes since last exam                Comments     DLS: 3/6/20    1. Severe LTG OU  2. VF Defects  OU  3. Pterygium OS  4. PCO OD  5. PCIOL OU  6. Type 2 DM no   7. Yag Cap OS    MEDS:  Brimonidine TID OU  Latanoprost QHS OU          Last edited by Chantel Weinstein MA on 8/3/2020  9:06 AM. (History)            Assessment /Plan     For exam results, see Encounter Report.    Low-tension glaucoma of both eyes, severe stage    Bilateral posterior capsular opacification    Pterygium of both eyes - Both Eyes    Hypertension associated with diabetes    Type 2 diabetes mellitus with diabetic neuropathy, unspecified whether long term insulin use    Pseudophakia - Both Eyes      LOST TO F/U 1/2014 TO 1/2016 - 2 YEARS     Low Tension Glaucoma - severe stage ou  Begun on gtts 1999  First [id number]  First photos 1999           Family history    neg        Glaucoma meds    Latanoprost ou - ( use to use trusopt ou bid and alphagan)        H/O adverse rxn to glaucoma drops    Intol to timolol - heart palpitations        LASERS    ALT OD 9/15/2003 // yag cap os 3/22/2018         GLAUCOMA SURGERIES    none        OTHER EYE SURGERIES    PC IOL od 1/28/2009 // os 11/6/2009        CDR    0.8 w/ inf pit / 0.7 w/ inf notch (h/o sup disc heme os 8/22/2008)        Tbase    16-20 ou          Tmax    20/20            Ttarget    14/14             HVF    16 test 2000 to 2019 OD: sup alt defect, split fixation; OS: SAD with split fixation (? prog os )         Gonio    +3 ou        CCT    553/555        OCT   5 tests  2006 to 2017 - OD:dec I bord T  // OS:dec I bord T         HRT    7 test 2004 to 2020 -  -  Dec. S/I od // dec. I, bord T os /// CDR 0.75 od // 0.73 os        Disc photos    1999, 1005 - slides // 2008, 210, 2012, 2016, 2019   - OIS    - Ttoday   12/12  - Test done today   gonio    2.  VF defects arcuate w/ split fixation ou - 2/2 #1    3.  PCO ou - Vis sign os     + decrease in VA with BAT ou -    S/P yag cap os 3/22/2018    No need for yag cap od yet - cont to monitor- central vis axis still clear      4.  PC IOL OU       OD 1/28/2009        OS 11/6/2008     5. Pterygium OS - causing  a FB sensation - AT's prn    Also small one od - ?? Previous excision     6. DM- no DR on exam    7. H/O malaria - post [place] - decades ago at age 23   Recovered - used hydroxychloroquine a long time ago     PLAN  Stable exam   target - 14/14  ou   Cont  latanoprost, -   Cont k brimonidine tid ou - good resp 16/16--> 12/12   If needed can add dorzolamide  Also is a candidate for slt - H/O ALT OD 2003 - ?? Any effect   S/P  yag cap os - done - 3/22/2018 - states vsion better // shadow gone     Minimal PCO od at this time - monitor    cont AT's  PRN    F/U - 4 months  IOP check / HVF / DFE / OCT -  if IOP above target consider SLT - wide open     I have seen and personally examined the patient.  I agree with the findings, assessment and plan of the resident and/or fellow.     Lona Grace MD

## 2020-08-05 ENCOUNTER — PATIENT MESSAGE (OUTPATIENT)
Dept: INTERNAL MEDICINE | Facility: CLINIC | Age: 75
End: 2020-08-05

## 2020-08-05 DIAGNOSIS — L85.3 DRY SKIN: Primary | ICD-10-CM

## 2020-08-06 RX ORDER — ECONAZOLE NITRATE 10 MG/G
CREAM TOPICAL DAILY
Qty: 85 G | Refills: 1 | Status: SHIPPED | OUTPATIENT
Start: 2020-08-06 | End: 2022-04-20

## 2020-08-27 ENCOUNTER — PES CALL (OUTPATIENT)
Dept: ADMINISTRATIVE | Facility: CLINIC | Age: 75
End: 2020-08-27

## 2020-09-29 ENCOUNTER — PATIENT MESSAGE (OUTPATIENT)
Dept: OTHER | Facility: OTHER | Age: 75
End: 2020-09-29

## 2020-11-03 ENCOUNTER — TELEPHONE (OUTPATIENT)
Dept: INTERNAL MEDICINE | Facility: CLINIC | Age: 75
End: 2020-11-03

## 2020-11-03 NOTE — TELEPHONE ENCOUNTER
Please ask if patient has blood pressure cuff at home to monitor blood pressure. If so please ask him to hold his medications, check his blood pressure daily for the next week, and then send in the readings. If he doesn't have a cuff, please let me know so I can get him set up with [facility].

## 2020-11-03 NOTE — TELEPHONE ENCOUNTER
----- Message from Yoselin Lay sent at 11/3/2020 10:17 AM CST -----  Contact: Alie(wife) [phone]  Pt is experiencing side effects of bp rx. Please advise

## 2020-11-03 NOTE — TELEPHONE ENCOUNTER
Spoke with pt he stated he has a blood pressure monitor at home and will hold the medication and monitor readings for the next week.   -Darlene

## 2020-11-03 NOTE — TELEPHONE ENCOUNTER
"I spoke to his wife. She states that his symptoms started last week. He felt weak and "bad". He stayed in the bed a couple of days and he felt better. He noticed when he took his B/P medicines he feels this way. He does not feel "bad" when he stops his blood pressure medication . She states that he has been on these medication for years. Amlodipine 10 mg and benazepril 20 mg. He denies fever, dyspnea or SOB. He denies any Covid -19 symptoms.  "

## 2020-11-05 ENCOUNTER — PATIENT OUTREACH (OUTPATIENT)
Dept: ADMINISTRATIVE | Facility: OTHER | Age: 75
End: 2020-11-05

## 2020-11-05 ENCOUNTER — PATIENT MESSAGE (OUTPATIENT)
Dept: INTERNAL MEDICINE | Facility: CLINIC | Age: 75
End: 2020-11-05

## 2020-11-06 ENCOUNTER — CLINICAL SUPPORT (OUTPATIENT)
Dept: OPHTHALMOLOGY | Facility: CLINIC | Age: 75
End: 2020-11-06
Payer: MEDICARE

## 2020-11-06 ENCOUNTER — OFFICE VISIT (OUTPATIENT)
Dept: OPHTHALMOLOGY | Facility: CLINIC | Age: 75
End: 2020-11-06
Payer: MEDICARE

## 2020-11-06 DIAGNOSIS — I15.2 HYPERTENSION ASSOCIATED WITH DIABETES: ICD-10-CM

## 2020-11-06 DIAGNOSIS — H11.003 PTERYGIUM OF BOTH EYES: ICD-10-CM

## 2020-11-06 DIAGNOSIS — Z96.1 PSEUDOPHAKIA: ICD-10-CM

## 2020-11-06 DIAGNOSIS — E11.59 HYPERTENSION ASSOCIATED WITH DIABETES: ICD-10-CM

## 2020-11-06 DIAGNOSIS — H26.493 BILATERAL POSTERIOR CAPSULAR OPACIFICATION: ICD-10-CM

## 2020-11-06 DIAGNOSIS — H40.1233 LOW-TENSION GLAUCOMA OF BOTH EYES, SEVERE STAGE: ICD-10-CM

## 2020-11-06 DIAGNOSIS — H40.1233 LOW-TENSION GLAUCOMA OF BOTH EYES, SEVERE STAGE: Primary | ICD-10-CM

## 2020-11-06 PROCEDURE — 99999 PR PBB SHADOW E&M-EST. PATIENT-LVL III: ICD-10-PCS | Mod: PBBFAC,HCNC,, | Performed by: OPHTHALMOLOGY

## 2020-11-06 PROCEDURE — 92014 COMPRE OPH EXAM EST PT 1/>: CPT | Mod: HCNC,S$GLB,, | Performed by: OPHTHALMOLOGY

## 2020-11-06 PROCEDURE — 99999 PR PBB SHADOW E&M-EST. PATIENT-LVL III: CPT | Mod: PBBFAC,HCNC,, | Performed by: OPHTHALMOLOGY

## 2020-11-06 PROCEDURE — 92014 PR EYE EXAM, EST PATIENT,COMPREHESV: ICD-10-PCS | Mod: HCNC,S$GLB,, | Performed by: OPHTHALMOLOGY

## 2020-11-07 DIAGNOSIS — E78.5 HYPERLIPIDEMIA ASSOCIATED WITH TYPE 2 DIABETES MELLITUS: Chronic | ICD-10-CM

## 2020-11-07 DIAGNOSIS — I15.2 HYPERTENSION ASSOCIATED WITH DIABETES: Chronic | ICD-10-CM

## 2020-11-07 DIAGNOSIS — E11.59 HYPERTENSION ASSOCIATED WITH DIABETES: Chronic | ICD-10-CM

## 2020-11-07 DIAGNOSIS — E11.69 HYPERLIPIDEMIA ASSOCIATED WITH TYPE 2 DIABETES MELLITUS: Chronic | ICD-10-CM

## 2020-11-07 NOTE — TELEPHONE ENCOUNTER
Care Due:                  Date            Visit Type   Department     Provider  --------------------------------------------------------------------------------                                PHYSICAL -                              ESTABLISHED   [facility]  Last Visit: 10-      PATIENT      MEDICINE       [address]  Next Visit: None Scheduled  None         None Found                                                            Last  Test          Frequency    Reason                     Performed    Due Date  --------------------------------------------------------------------------------    Office Visit  12 months..  benazepril, pravastatin..  10-   10-    CMP.........  12 months..  benazepril, pravastatin..  10-   10-    Lipid Panel.  12 months..  pravastatin..............  10-   10-    Powered by [employer]. Reference number: [id number]. 11/07/2020 12:18:43 AM   CST

## 2020-11-08 RX ORDER — PRAVASTATIN SODIUM 40 MG/1
TABLET ORAL
Qty: 90 TABLET | Refills: 0 | Status: SHIPPED | OUTPATIENT
Start: 2020-11-08 | End: 2021-02-01

## 2020-11-08 RX ORDER — AMLODIPINE BESYLATE 10 MG/1
TABLET ORAL
Qty: 90 TABLET | Refills: 0 | Status: SHIPPED | OUTPATIENT
Start: 2020-11-08 | End: 2021-02-01

## 2020-11-08 NOTE — PROGRESS NOTES
Refill Routing Note   Medication(s) are not appropriate for processing by [facility] for the following reason(s):     - Drug-Disease Interaction (benazepril and stage 2 kidney disease )    [facility] actions taken in this encounter:   Defer  Approve    Medication-related problems identified:   Drug-disease interaction  Requires labs  Requires appointment  Medication Therapy Plan: CDMR. LABS: (CMP/LIPID); APPT: (Annual); Drug-Disease: benazepriL and Stage 2 chronic kidney disease due to type 2 diabetes mellitus; Defer benazepril; Approve amLODIPine  Medication reconciliation completed: No   Automatic Epic Generated Protocol Data:    Orders Placed This Encounter    Comprehensive Metabolic Panel    Lipid Panel    amLODIPine (NORVASC) 10 MG tablet    pravastatin (PRAVACHOL) 40 MG tablet      Requested Prescriptions   Pending Prescriptions Disp Refills    benazepriL (LOTENSIN) 20 MG tablet [Pharmacy Med Name: BENAZEPRIL HCL 20 MG TABLET] 90 tablet 0     Sig: TAKE 1 TABLET (20 MG TOTAL) BY MOUTH ONCE DAILY.       ACE Inhibitors Refill Protocol Failed - 11/7/2020 12:18 AM        Failed - Serum Creatinine less than 1.4 on file in the past 12 months     Lab Results   Component Value Date    CREATININE 1.0 10/24/2019    CREATININE 0.9 10/11/2018    CREATININE 0.9 05/18/2017     Lab Results   Component Value Date    EGFRNONAA >60.0 10/24/2019    EGFRNONAA >60 10/11/2018    EGFRNONAA >60.0 05/18/2017               Failed - Serum Potassium less than 5.2 on file in the past 12 months     Lab Results   Component Value Date    K 4.3 10/24/2019    K 4.1 10/11/2018    K 4.4 05/18/2017                  Passed - Visit with authorizing provider in past 12 months or upcoming 90 days         Passed - Blood Pressure under 139/89 on file in past 12 months      BP Readings from Last 3 Encounters:   12/23/19 110/62   11/12/19 128/76   10/31/19 122/80            Cardiovascular:  ACE Inhibitors Failed - 11/7/2020 12:18 AM        Failed  - Cr is 1.4 or below and within 360 days     Creatinine   Date Value Ref Range Status   10/24/2019 1.0 0.5 - 1.4 mg/dL Final   10/11/2018 0.9 0.5 - 1.4 mg/dL Final   05/18/2017 0.9 0.5 - 1.4 mg/dL Final              Failed - K in normal range and within 360 days     Potassium   Date Value Ref Range Status   10/24/2019 4.3 3.5 - 5.1 mmol/L Final   10/11/2018 4.1 3.5 - 5.1 mmol/L Final   05/18/2017 4.4 3.5 - 5.1 mmol/L Final              Failed - eGFR within 360 days     eGFR if non    Date Value Ref Range Status   10/24/2019 >60.0 >60 mL/min/1.73 m^2 Final     Comment:     Calculation used to obtain the estimated glomerular filtration  rate (eGFR) is the CKD-EPI equation.      10/11/2018 >60 >60 mL/min/1.73 m^2 Final     Comment:     Calculation used to obtain the estimated glomerular filtration  rate (eGFR) is the CKD-EPI equation.      05/18/2017 >60.0 >60 mL/min/1.73 m^2 Final     Comment:     Calculation used to obtain the estimated glomerular filtration  rate (eGFR) is the CKD-EPI equation. Since race is unknown   in our information system, the eGFR values for   -American and Non--American patients are given   for each creatinine result.       eGFR if    Date Value Ref Range Status   10/24/2019 >60.0 >60 mL/min/1.73 m^2 Final   10/11/2018 >60 >60 mL/min/1.73 m^2 Final   05/18/2017 >60.0 >60 mL/min/1.73 m^2 Final              Passed - Patient is at least 18 years old        Passed - Last BP in normal range within 360 days.     BP Readings from Last 3 Encounters:   12/23/19 110/62   11/12/19 128/76   10/31/19 122/80              Passed - Office visit in past 12 months or future 90 days     Recent Outpatient Visits            2 days ago Low-tension glaucoma of both eyes, severe stage    [facility] Lona Grace MD    3 months ago Low-tension glaucoma of both eyes, severe stage    [facility] Lona Grace MD    8 months  ago Low-tension glaucoma of both eyes, severe stage    [facility] 1[facility] Loan Grace MD    10 months ago Encounter for preventive health examination    [facility] Estela Gill NP    1 year ago Low-tension glaucoma, bilateral, severe stage    [facility] Lona Grace MD          Future Appointments              In 3 days MD [facility] Bailey II edgar [facility], [facility] PCW    In 3 months Lona Grace MD [facility], [facility]                 Signed Prescriptions Disp Refills    amLODIPine (NORVASC) 10 MG tablet 90 tablet 0     Sig: TAKE 1 TABLET BY MOUTH EVERY DAY       Calcium-Channel Blockers Protocol Passed - 11/7/2020 12:18 AM        Passed - Visit with authorizing provider in past 12 months or upcoming 90 days         Passed - Blood Pressure below 139/89 on file in past 12 months      BP Readings from Last 3 Encounters:   12/23/19 110/62   11/12/19 128/76   10/31/19 122/80            Cardiovascular:  Calcium Channel Blockers Passed - 11/7/2020 12:18 AM        Passed - Patient is at least 18 years old        Passed - Last BP in normal range within 360 days.     BP Readings from Last 3 Encounters:   12/23/19 110/62   11/12/19 128/76   10/31/19 122/80              Passed - Office visit in past 12 months or future 90 days     Recent Outpatient Visits            2 days ago Low-tension glaucoma of both eyes, severe stage    [facility] Lona Grace MD    3 months ago Low-tension glaucoma of both eyes, severe stage    [facility] Lona Grace MD    8 months ago Low-tension glaucoma of both eyes, severe stage    [facility] Lona Grace MD    10 months ago Encounter for preventive health examination    [facility] Estela Gill NP    1 year ago Low-tension glaucoma, bilateral, severe  stage    [facility] 1st Fl Lona Grace MD          Future Appointments              In 3 days MD [facility]im Bailey II [facility], [facility] PCW    In 3 months MD [facility] Chou [facility] 1st Fl, [facility]                  pravastatin (PRAVACHOL) 40 MG tablet 90 tablet 0     Sig: TAKE 1 TABLET BY MOUTH EVERY DAY       Hmg CoA Reductase Inhibitors Protocol Failed - 11/7/2020 12:18 AM        Failed - Lipid Panel within past 12 months     Lab Results   Component Value Date    CHOL 210 (H) 10/24/2019     (H) 10/24/2019    LDLCALC 95.0 10/24/2019    TRIG 55 10/24/2019             Failed - ALT less than 95 in past 12 months      Lab Results   Component Value Date    ALT 40 10/24/2019    ALT 27 05/18/2017    ALT 18 03/21/2016              Passed - Recent or future visit with authorizing provider       Cardiovascular:  Antilipid - Statins Failed - 11/7/2020 12:18 AM        Failed - ALT is 94 or below and within 360 days     ALT   Date Value Ref Range Status   10/24/2019 40 10 - 44 U/L Final   05/18/2017 27 10 - 44 U/L Final   03/21/2016 18 10 - 44 U/L Final              Failed - AST is 54 or below and within 360 days     AST   Date Value Ref Range Status   10/24/2019 42 (H) 10 - 40 U/L Final   05/18/2017 28 10 - 40 U/L Final   03/21/2016 20 10 - 40 U/L Final              Failed - Total Cholesterol within 360 days     Cholesterol   Date Value Ref Range Status   10/24/2019 210 (H) 120 - 199 mg/dL Final     Comment:     The National Cholesterol Education Program (NCEP) has set the  following guidelines (reference ranges) for Cholesterol:  Optimal.....................<200 mg/dL  Borderline High.............200-239 mg/dL  High........................> or = 240 mg/dL     10/11/2018 195 120 - 199 mg/dL Final     Comment:     The National Cholesterol Education Program (NCEP) has set the  following guidelines (reference ranges) for  Cholesterol:  Optimal.....................<200 mg/dL  Borderline High.............200-239 mg/dL  High........................> or = 240 mg/dL     05/18/2017 210 (H) 120 - 199 mg/dL Final     Comment:     The National Cholesterol Education Program (NCEP) has set the  following guidelines (reference ranges) for Cholesterol:  Optimal.....................<200 mg/dL  Borderline High.............200-239 mg/dL  High........................> or = 240 mg/dL                Failed - LDL within 360 days     LDL Cholesterol   Date Value Ref Range Status   10/24/2019 95.0 63.0 - 159.0 mg/dL Final     Comment:     The National Cholesterol Education Program (NCEP) has set the  following guidelines (reference values) for LDL Cholesterol:  Optimal.......................<130 mg/dL  Borderline High...............130-159 mg/dL  High..........................160-189 mg/dL  Very High.....................>190 mg/dL              Failed - HDL within 360 days     HDL   Date Value Ref Range Status   10/24/2019 104 (H) 40 - 75 mg/dL Final     Comment:     The National Cholesterol Education Program (NCEP) has set the  following guidelines (reference values) for HDL Cholesterol:  Low...............<40 mg/dL  Optimal...........>60 mg/dL              Failed - Triglycerides within 360 days     Triglycerides   Date Value Ref Range Status   10/24/2019 55 30 - 150 mg/dL Final     Comment:     The National Cholesterol Education Program (NCEP) has set the  following guidelines (reference values) for triglycerides:  Normal......................<150 mg/dL  Borderline High.............150-199 mg/dL  High........................200-499 mg/dL     10/11/2018 52 30 - 150 mg/dL Final     Comment:     The National Cholesterol Education Program (NCEP) has set the  following guidelines (reference values) for triglycerides:  Normal......................<150 mg/dL  Borderline High.............150-199 mg/dL  High........................200-499 mg/dL     05/18/2017 42  30 - 150 mg/dL Final     Comment:     The National Cholesterol Education Program (NCEP) has set the  following guidelines (reference values) for triglycerides:  Normal......................<150 mg/dL  Borderline High.............150-199 mg/dL  High........................200-499 mg/dL                Passed - Patient is at least 18 years old        Passed - Office visit in past 12 months or future 90 days     Recent Outpatient Visits            2 days ago Low-tension glaucoma of both eyes, severe stage    [facility] Lona Grace MD    3 months ago Low-tension glaucoma of both eyes, severe stage    [facility] Lona Grace MD    8 months ago Low-tension glaucoma of both eyes, severe stage    [facility] Lona Grace MD    10 months ago Encounter for preventive health examination    [facility] Estela Gill, NP    1 year ago Low-tension glaucoma, bilateral, severe stage    [facility] Lona Grace MD          Future Appointments              In 3 days MD [facility], [address] PCW    In 3 months Lona Grace MD [facility], [address]                      Appointments  past 12m or future 3m with PCP    Date Provider   Last Visit   10/31/2019 Naresh Verdin MD   Next Visit   Visit date not found Naresh Verdin MD   ED visits in past 90 days: 0        Note composed:11:11 AM 11/08/2020

## 2020-11-09 RX ORDER — BENAZEPRIL HYDROCHLORIDE 20 MG/1
20 TABLET ORAL DAILY
Qty: 90 TABLET | Refills: 0 | Status: SHIPPED | OUTPATIENT
Start: 2020-11-09 | End: 2021-02-09

## 2020-11-11 ENCOUNTER — OFFICE VISIT (OUTPATIENT)
Dept: INTERNAL MEDICINE | Facility: CLINIC | Age: 75
End: 2020-11-11
Payer: MEDICARE

## 2020-11-11 ENCOUNTER — IMMUNIZATION (OUTPATIENT)
Dept: PHARMACY | Facility: CLINIC | Age: 75
End: 2020-11-11
Payer: MEDICARE

## 2020-11-11 ENCOUNTER — PATIENT MESSAGE (OUTPATIENT)
Dept: INTERNAL MEDICINE | Facility: CLINIC | Age: 75
End: 2020-11-11

## 2020-11-11 VITALS
SYSTOLIC BLOOD PRESSURE: 120 MMHG | BODY MASS INDEX: 23.35 KG/M2 | WEIGHT: 145.31 LBS | HEIGHT: 66 IN | DIASTOLIC BLOOD PRESSURE: 68 MMHG | HEART RATE: 58 BPM

## 2020-11-11 DIAGNOSIS — I95.2 HYPOTENSION DUE TO DRUGS: Primary | ICD-10-CM

## 2020-11-11 DIAGNOSIS — I10 HYPERTENSION, ESSENTIAL, BENIGN: ICD-10-CM

## 2020-11-11 PROCEDURE — 1159F PR MEDICATION LIST DOCUMENTED IN MEDICAL RECORD: ICD-10-PCS | Mod: HCNC,S$GLB,, | Performed by: INTERNAL MEDICINE

## 2020-11-11 PROCEDURE — 3074F PR MOST RECENT SYSTOLIC BLOOD PRESSURE < 130 MM HG: ICD-10-PCS | Mod: HCNC,CPTII,S$GLB, | Performed by: INTERNAL MEDICINE

## 2020-11-11 PROCEDURE — 99213 OFFICE O/P EST LOW 20 MIN: CPT | Mod: HCNC,S$GLB,, | Performed by: INTERNAL MEDICINE

## 2020-11-11 PROCEDURE — 3078F PR MOST RECENT DIASTOLIC BLOOD PRESSURE < 80 MM HG: ICD-10-PCS | Mod: HCNC,CPTII,S$GLB, | Performed by: INTERNAL MEDICINE

## 2020-11-11 PROCEDURE — 3288F PR FALLS RISK ASSESSMENT DOCUMENTED: ICD-10-PCS | Mod: HCNC,CPTII,S$GLB, | Performed by: INTERNAL MEDICINE

## 2020-11-11 PROCEDURE — 99213 PR OFFICE/OUTPT VISIT, EST, LEVL III, 20-29 MIN: ICD-10-PCS | Mod: HCNC,S$GLB,, | Performed by: INTERNAL MEDICINE

## 2020-11-11 PROCEDURE — 1100F PR PT FALLS ASSESS DOC 2+ FALLS/FALL W/INJURY/YR: ICD-10-PCS | Mod: HCNC,CPTII,S$GLB, | Performed by: INTERNAL MEDICINE

## 2020-11-11 PROCEDURE — 3078F DIAST BP <80 MM HG: CPT | Mod: HCNC,CPTII,S$GLB, | Performed by: INTERNAL MEDICINE

## 2020-11-11 PROCEDURE — 1100F PTFALLS ASSESS-DOCD GE2>/YR: CPT | Mod: HCNC,CPTII,S$GLB, | Performed by: INTERNAL MEDICINE

## 2020-11-11 PROCEDURE — 99999 PR PBB SHADOW E&M-EST. PATIENT-LVL IV: CPT | Mod: PBBFAC,HCNC,, | Performed by: INTERNAL MEDICINE

## 2020-11-11 PROCEDURE — 1126F AMNT PAIN NOTED NONE PRSNT: CPT | Mod: HCNC,S$GLB,, | Performed by: INTERNAL MEDICINE

## 2020-11-11 PROCEDURE — 99999 PR PBB SHADOW E&M-EST. PATIENT-LVL IV: ICD-10-PCS | Mod: PBBFAC,HCNC,, | Performed by: INTERNAL MEDICINE

## 2020-11-11 PROCEDURE — 1126F PR PAIN SEVERITY QUANTIFIED, NO PAIN PRESENT: ICD-10-PCS | Mod: HCNC,S$GLB,, | Performed by: INTERNAL MEDICINE

## 2020-11-11 PROCEDURE — 3288F FALL RISK ASSESSMENT DOCD: CPT | Mod: HCNC,CPTII,S$GLB, | Performed by: INTERNAL MEDICINE

## 2020-11-11 PROCEDURE — 1159F MED LIST DOCD IN RCRD: CPT | Mod: HCNC,S$GLB,, | Performed by: INTERNAL MEDICINE

## 2020-11-11 PROCEDURE — 3074F SYST BP LT 130 MM HG: CPT | Mod: HCNC,CPTII,S$GLB, | Performed by: INTERNAL MEDICINE

## 2020-11-11 NOTE — PROGRESS NOTES
"Yong Harmon is a 75 y.o. White male who presents for same day visit with a chief complaint of Hypotension (stopped both BP meds 6 days ago because he was running 80/60 and feeling bad. Since stopping he has been 120-130/<80 and feeling back to normal.)    No other complaints.       [facility] information for this encounter was reviewed and updated today where necessary.  Medications and allergies have been reviewed and updated today.    Recent test results reviewed:  Lab Results   Component Value Date    WBC 7.51 10/24/2019    HGB 14.5 10/24/2019    HCT 44.9 10/24/2019     (H) 10/24/2019    CHOL 210 (H) 10/24/2019    TRIG 55 10/24/2019     (H) 10/24/2019    ALT 40 10/24/2019    AST 42 (H) 10/24/2019     (L) 10/24/2019    K 4.3 10/24/2019    CL 98 10/24/2019    CREATININE 1.0 10/24/2019    BUN 9 10/24/2019    CO2 28 10/24/2019    TSH 2.073 03/21/2016    PSA 1.7 03/21/2016    HGBA1C 5.3 10/24/2019       Review of Systems   Constitutional: Negative.    Respiratory: Negative.    Cardiovascular: Negative.      Vitals:    11/11/20 1018   BP: 120/68   BP Location: Left arm   Patient Position: Sitting   BP Method: Medium (Manual)   Pulse: (!) 58   Weight: 65.9 kg (145 lb 4.5 oz)   Height: 5' 6" (1.676 m)   Body mass index is 23.45 kg/m².   Physical Exam  Constitutional:       General: He is not in acute distress.     Appearance: Normal appearance. He is normal weight. He is not ill-appearing.   Cardiovascular:      Rate and Rhythm: Normal rate and regular rhythm.   Pulmonary:      Effort: Pulmonary effort is normal.   Neurological:      General: No focal deficit present.      Mental Status: He is alert.   Psychiatric:         Behavior: Behavior normal.       Assessment/plan:   1. Hypotension due to drugs  Goals      Blood Pressure < 140/90      Stay off BP meds for now. If BP starts to get high again restart the Lisinopril once a day and call Dr. Verdin to inform.              2. Hypertension, essential, " benign  Off all meds currently. Recheck with Dr. Verdin at next visit.

## 2020-11-11 NOTE — TELEPHONE ENCOUNTER
Spoke with patient notified per chart note Stop both BP medications and to take Lisinopril if blood pressure goes up one a day and inform Dr Verdin.

## 2020-12-21 DIAGNOSIS — H40.1233 LOW-TENSION GLAUCOMA OF BOTH EYES, SEVERE STAGE: ICD-10-CM

## 2020-12-21 RX ORDER — LATANOPROST 50 UG/ML
1 SOLUTION/ DROPS OPHTHALMIC NIGHTLY
Qty: 3 BOTTLE | Refills: 3 | Status: SHIPPED | OUTPATIENT
Start: 2020-12-21 | End: 2021-06-25 | Stop reason: SDUPTHER

## 2021-02-05 ENCOUNTER — PES CALL (OUTPATIENT)
Dept: ADMINISTRATIVE | Facility: CLINIC | Age: 76
End: 2021-02-05

## 2021-02-08 ENCOUNTER — PATIENT MESSAGE (OUTPATIENT)
Dept: INTERNAL MEDICINE | Facility: CLINIC | Age: 76
End: 2021-02-08

## 2021-02-08 DIAGNOSIS — E11.59 HYPERTENSION ASSOCIATED WITH DIABETES: Chronic | ICD-10-CM

## 2021-02-08 DIAGNOSIS — I15.2 HYPERTENSION ASSOCIATED WITH DIABETES: Chronic | ICD-10-CM

## 2021-02-09 RX ORDER — BENAZEPRIL HYDROCHLORIDE 20 MG/1
TABLET ORAL
Qty: 90 TABLET | Refills: 0 | Status: SHIPPED | OUTPATIENT
Start: 2021-02-09 | End: 2021-04-07

## 2021-02-26 ENCOUNTER — PES CALL (OUTPATIENT)
Dept: ADMINISTRATIVE | Facility: CLINIC | Age: 76
End: 2021-02-26

## 2021-03-03 ENCOUNTER — PATIENT OUTREACH (OUTPATIENT)
Dept: ADMINISTRATIVE | Facility: OTHER | Age: 76
End: 2021-03-03

## 2021-03-05 ENCOUNTER — OFFICE VISIT (OUTPATIENT)
Dept: OPHTHALMOLOGY | Facility: CLINIC | Age: 76
End: 2021-03-05
Payer: MEDICARE

## 2021-03-05 DIAGNOSIS — I15.2 HYPERTENSION ASSOCIATED WITH DIABETES: ICD-10-CM

## 2021-03-05 DIAGNOSIS — H26.493 PCO (POSTERIOR CAPSULAR OPACIFICATION), BILATERAL: ICD-10-CM

## 2021-03-05 DIAGNOSIS — H53.413 SCOTOMA, CENTRAL, BILATERAL: ICD-10-CM

## 2021-03-05 DIAGNOSIS — H40.1233 LOW-TENSION GLAUCOMA OF BOTH EYES, SEVERE STAGE: Primary | ICD-10-CM

## 2021-03-05 DIAGNOSIS — E11.9 DIABETES MELLITUS TYPE 2 WITHOUT RETINOPATHY: ICD-10-CM

## 2021-03-05 DIAGNOSIS — E11.40 TYPE 2 DIABETES MELLITUS WITH DIABETIC NEUROPATHY, UNSPECIFIED WHETHER LONG TERM INSULIN USE: ICD-10-CM

## 2021-03-05 DIAGNOSIS — H11.003 PTERYGIUM OF BOTH EYES: ICD-10-CM

## 2021-03-05 DIAGNOSIS — E11.59 HYPERTENSION ASSOCIATED WITH DIABETES: ICD-10-CM

## 2021-03-05 DIAGNOSIS — H11.001 PTERYGIUM EYE, RIGHT: ICD-10-CM

## 2021-03-05 DIAGNOSIS — H26.493 BILATERAL POSTERIOR CAPSULAR OPACIFICATION: ICD-10-CM

## 2021-03-05 PROCEDURE — 3288F PR FALLS RISK ASSESSMENT DOCUMENTED: ICD-10-PCS | Mod: CPTII,S$GLB,, | Performed by: OPHTHALMOLOGY

## 2021-03-05 PROCEDURE — 92012 INTRM OPH EXAM EST PATIENT: CPT | Mod: S$GLB,,, | Performed by: OPHTHALMOLOGY

## 2021-03-05 PROCEDURE — 99999 PR PBB SHADOW E&M-EST. PATIENT-LVL III: ICD-10-PCS | Mod: PBBFAC,,, | Performed by: OPHTHALMOLOGY

## 2021-03-05 PROCEDURE — 1101F PT FALLS ASSESS-DOCD LE1/YR: CPT | Mod: CPTII,S$GLB,, | Performed by: OPHTHALMOLOGY

## 2021-03-05 PROCEDURE — 92133 HEIDELBERG RETINA TOMOGRAPHY (HRT) - OU - BOTH EYES: ICD-10-PCS | Mod: S$GLB,,, | Performed by: OPHTHALMOLOGY

## 2021-03-05 PROCEDURE — 1126F AMNT PAIN NOTED NONE PRSNT: CPT | Mod: S$GLB,,, | Performed by: OPHTHALMOLOGY

## 2021-03-05 PROCEDURE — 92133 CPTRZD OPH DX IMG PST SGM ON: CPT | Mod: S$GLB,,, | Performed by: OPHTHALMOLOGY

## 2021-03-05 PROCEDURE — 3288F FALL RISK ASSESSMENT DOCD: CPT | Mod: CPTII,S$GLB,, | Performed by: OPHTHALMOLOGY

## 2021-03-05 PROCEDURE — 92012 PR EYE EXAM, EST PATIENT,INTERMED: ICD-10-PCS | Mod: S$GLB,,, | Performed by: OPHTHALMOLOGY

## 2021-03-05 PROCEDURE — 99999 PR PBB SHADOW E&M-EST. PATIENT-LVL III: CPT | Mod: PBBFAC,,, | Performed by: OPHTHALMOLOGY

## 2021-03-05 PROCEDURE — 1126F PR PAIN SEVERITY QUANTIFIED, NO PAIN PRESENT: ICD-10-PCS | Mod: S$GLB,,, | Performed by: OPHTHALMOLOGY

## 2021-03-05 PROCEDURE — 1101F PR PT FALLS ASSESS DOC 0-1 FALLS W/OUT INJ PAST YR: ICD-10-PCS | Mod: CPTII,S$GLB,, | Performed by: OPHTHALMOLOGY

## 2021-03-23 ENCOUNTER — TELEPHONE (OUTPATIENT)
Dept: ADMINISTRATIVE | Facility: CLINIC | Age: 76
End: 2021-03-23

## 2021-03-25 ENCOUNTER — OFFICE VISIT (OUTPATIENT)
Dept: INTERNAL MEDICINE | Facility: CLINIC | Age: 76
End: 2021-03-25
Payer: MEDICARE

## 2021-03-25 ENCOUNTER — LAB VISIT (OUTPATIENT)
Dept: LAB | Facility: HOSPITAL | Age: 76
End: 2021-03-25
Attending: INTERNAL MEDICINE
Payer: MEDICARE

## 2021-03-25 ENCOUNTER — TELEPHONE (OUTPATIENT)
Dept: INTERNAL MEDICINE | Facility: CLINIC | Age: 76
End: 2021-03-25

## 2021-03-25 VITALS
HEIGHT: 66 IN | RESPIRATION RATE: 14 BRPM | SYSTOLIC BLOOD PRESSURE: 140 MMHG | HEART RATE: 80 BPM | WEIGHT: 146.19 LBS | BODY MASS INDEX: 23.49 KG/M2 | DIASTOLIC BLOOD PRESSURE: 80 MMHG

## 2021-03-25 DIAGNOSIS — Z00.00 ENCOUNTER FOR PREVENTIVE HEALTH EXAMINATION: Primary | ICD-10-CM

## 2021-03-25 DIAGNOSIS — E78.5 HYPERLIPIDEMIA ASSOCIATED WITH TYPE 2 DIABETES MELLITUS: ICD-10-CM

## 2021-03-25 DIAGNOSIS — R41.3 MEMORY CHANGE: ICD-10-CM

## 2021-03-25 DIAGNOSIS — E11.59 HYPERTENSION ASSOCIATED WITH DIABETES: ICD-10-CM

## 2021-03-25 DIAGNOSIS — E11.69 HYPERLIPIDEMIA ASSOCIATED WITH TYPE 2 DIABETES MELLITUS: Chronic | ICD-10-CM

## 2021-03-25 DIAGNOSIS — D69.2 SENILE PURPURA: ICD-10-CM

## 2021-03-25 DIAGNOSIS — I15.2 HYPERTENSION ASSOCIATED WITH DIABETES: ICD-10-CM

## 2021-03-25 DIAGNOSIS — E11.59 HYPERTENSION ASSOCIATED WITH DIABETES: Chronic | ICD-10-CM

## 2021-03-25 DIAGNOSIS — D75.839 THROMBOCYTOSIS: ICD-10-CM

## 2021-03-25 DIAGNOSIS — H91.93 CHANGE IN HEARING, BILATERAL: ICD-10-CM

## 2021-03-25 DIAGNOSIS — F10.20 UNCOMPLICATED ALCOHOL DEPENDENCE: ICD-10-CM

## 2021-03-25 DIAGNOSIS — E11.69 HYPERLIPIDEMIA ASSOCIATED WITH TYPE 2 DIABETES MELLITUS: ICD-10-CM

## 2021-03-25 DIAGNOSIS — E78.5 HYPERLIPIDEMIA ASSOCIATED WITH TYPE 2 DIABETES MELLITUS: Chronic | ICD-10-CM

## 2021-03-25 DIAGNOSIS — I15.2 HYPERTENSION ASSOCIATED WITH DIABETES: Chronic | ICD-10-CM

## 2021-03-25 LAB
ALBUMIN SERPL BCP-MCNC: 3.5 G/DL (ref 3.5–5.2)
ALP SERPL-CCNC: 118 U/L (ref 55–135)
ALT SERPL W/O P-5'-P-CCNC: 15 U/L (ref 10–44)
ANION GAP SERPL CALC-SCNC: 7 MMOL/L (ref 8–16)
AST SERPL-CCNC: 18 U/L (ref 10–40)
BILIRUB SERPL-MCNC: 0.5 MG/DL (ref 0.1–1)
BUN SERPL-MCNC: 10 MG/DL (ref 8–23)
CALCIUM SERPL-MCNC: 9.4 MG/DL (ref 8.7–10.5)
CHLORIDE SERPL-SCNC: 103 MMOL/L (ref 95–110)
CHOLEST SERPL-MCNC: 197 MG/DL (ref 120–199)
CHOLEST/HDLC SERPL: 2.6 {RATIO} (ref 2–5)
CO2 SERPL-SCNC: 28 MMOL/L (ref 23–29)
CREAT SERPL-MCNC: 0.9 MG/DL (ref 0.5–1.4)
ERYTHROCYTE [DISTWIDTH] IN BLOOD BY AUTOMATED COUNT: 19.7 % (ref 11.5–14.5)
EST. GFR  (AFRICAN AMERICAN): >60 ML/MIN/1.73 M^2
EST. GFR  (NON AFRICAN AMERICAN): >60 ML/MIN/1.73 M^2
ESTIMATED AVG GLUCOSE: 103 MG/DL (ref 68–131)
FERRITIN SERPL-MCNC: 38 NG/ML (ref 20–300)
GLUCOSE SERPL-MCNC: 85 MG/DL (ref 70–110)
HBA1C MFR BLD: 5.2 % (ref 4–5.6)
HCT VFR BLD AUTO: 45.2 % (ref 40–54)
HDLC SERPL-MCNC: 77 MG/DL (ref 40–75)
HDLC SERPL: 39.1 % (ref 20–50)
HGB BLD-MCNC: 14.5 G/DL (ref 14–18)
IRON SERPL-MCNC: 362 UG/DL (ref 45–160)
LDLC SERPL CALC-MCNC: 105 MG/DL (ref 63–159)
MCH RBC QN AUTO: 29.1 PG (ref 27–31)
MCHC RBC AUTO-ENTMCNC: 32.1 G/DL (ref 32–36)
MCV RBC AUTO: 91 FL (ref 82–98)
NONHDLC SERPL-MCNC: 120 MG/DL
PLATELET # BLD AUTO: 543 K/UL (ref 150–350)
PMV BLD AUTO: 8.8 FL (ref 9.2–12.9)
POTASSIUM SERPL-SCNC: 4.4 MMOL/L (ref 3.5–5.1)
PROT SERPL-MCNC: 8 G/DL (ref 6–8.4)
RBC # BLD AUTO: 4.99 M/UL (ref 4.6–6.2)
SATURATED IRON: 76 % (ref 20–50)
SODIUM SERPL-SCNC: 138 MMOL/L (ref 136–145)
TOTAL IRON BINDING CAPACITY: 474 UG/DL (ref 250–450)
TRANSFERRIN SERPL-MCNC: 320 MG/DL (ref 200–375)
TRIGL SERPL-MCNC: 75 MG/DL (ref 30–150)
VIT B12 SERPL-MCNC: 483 PG/ML (ref 210–950)
WBC # BLD AUTO: 10.14 K/UL (ref 3.9–12.7)

## 2021-03-25 PROCEDURE — 3079F PR MOST RECENT DIASTOLIC BLOOD PRESSURE 80-89 MM HG: ICD-10-PCS | Mod: CPTII,S$GLB,, | Performed by: NURSE PRACTITIONER

## 2021-03-25 PROCEDURE — 80053 COMPREHEN METABOLIC PANEL: CPT | Performed by: INTERNAL MEDICINE

## 2021-03-25 PROCEDURE — 83540 ASSAY OF IRON: CPT | Performed by: INTERNAL MEDICINE

## 2021-03-25 PROCEDURE — 1101F PT FALLS ASSESS-DOCD LE1/YR: CPT | Mod: CPTII,S$GLB,, | Performed by: NURSE PRACTITIONER

## 2021-03-25 PROCEDURE — 1101F PR PT FALLS ASSESS DOC 0-1 FALLS W/OUT INJ PAST YR: ICD-10-PCS | Mod: CPTII,S$GLB,, | Performed by: NURSE PRACTITIONER

## 2021-03-25 PROCEDURE — 85027 COMPLETE CBC AUTOMATED: CPT | Performed by: INTERNAL MEDICINE

## 2021-03-25 PROCEDURE — 99999 PR PBB SHADOW E&M-EST. PATIENT-LVL IV: ICD-10-PCS | Mod: PBBFAC,,, | Performed by: NURSE PRACTITIONER

## 2021-03-25 PROCEDURE — 3072F PR LOW RISK FOR RETINOPATHY: ICD-10-PCS | Mod: S$GLB,,, | Performed by: NURSE PRACTITIONER

## 2021-03-25 PROCEDURE — 83036 HEMOGLOBIN GLYCOSYLATED A1C: CPT | Performed by: INTERNAL MEDICINE

## 2021-03-25 PROCEDURE — 36415 COLL VENOUS BLD VENIPUNCTURE: CPT | Performed by: NURSE PRACTITIONER

## 2021-03-25 PROCEDURE — 99499 RISK ADDL DX/OHS AUDIT: ICD-10-PCS | Mod: HCNC,S$GLB,, | Performed by: NURSE PRACTITIONER

## 2021-03-25 PROCEDURE — G0439 PPPS, SUBSEQ VISIT: HCPCS | Mod: S$GLB,,, | Performed by: NURSE PRACTITIONER

## 2021-03-25 PROCEDURE — 99999 PR PBB SHADOW E&M-EST. PATIENT-LVL IV: CPT | Mod: PBBFAC,,, | Performed by: NURSE PRACTITIONER

## 2021-03-25 PROCEDURE — 3077F PR MOST RECENT SYSTOLIC BLOOD PRESSURE >= 140 MM HG: ICD-10-PCS | Mod: CPTII,S$GLB,, | Performed by: NURSE PRACTITIONER

## 2021-03-25 PROCEDURE — 3072F LOW RISK FOR RETINOPATHY: CPT | Mod: S$GLB,,, | Performed by: NURSE PRACTITIONER

## 2021-03-25 PROCEDURE — 82607 VITAMIN B-12: CPT | Performed by: NURSE PRACTITIONER

## 2021-03-25 PROCEDURE — G0439 PR MEDICARE ANNUAL WELLNESS SUBSEQUENT VISIT: ICD-10-PCS | Mod: S$GLB,,, | Performed by: NURSE PRACTITIONER

## 2021-03-25 PROCEDURE — 99499 UNLISTED E&M SERVICE: CPT | Mod: HCNC,S$GLB,, | Performed by: NURSE PRACTITIONER

## 2021-03-25 PROCEDURE — 1126F AMNT PAIN NOTED NONE PRSNT: CPT | Mod: S$GLB,,, | Performed by: NURSE PRACTITIONER

## 2021-03-25 PROCEDURE — 80061 LIPID PANEL: CPT | Performed by: INTERNAL MEDICINE

## 2021-03-25 PROCEDURE — 3288F FALL RISK ASSESSMENT DOCD: CPT | Mod: CPTII,S$GLB,, | Performed by: NURSE PRACTITIONER

## 2021-03-25 PROCEDURE — 1158F PR ADVANCE CARE PLANNING DISCUSS DOCUMENTED IN MEDICAL RECORD: ICD-10-PCS | Mod: S$GLB,,, | Performed by: NURSE PRACTITIONER

## 2021-03-25 PROCEDURE — 82728 ASSAY OF FERRITIN: CPT | Performed by: INTERNAL MEDICINE

## 2021-03-25 PROCEDURE — 3077F SYST BP >= 140 MM HG: CPT | Mod: CPTII,S$GLB,, | Performed by: NURSE PRACTITIONER

## 2021-03-25 PROCEDURE — 3288F PR FALLS RISK ASSESSMENT DOCUMENTED: ICD-10-PCS | Mod: CPTII,S$GLB,, | Performed by: NURSE PRACTITIONER

## 2021-03-25 PROCEDURE — 3079F DIAST BP 80-89 MM HG: CPT | Mod: CPTII,S$GLB,, | Performed by: NURSE PRACTITIONER

## 2021-03-25 PROCEDURE — 1158F ADVNC CARE PLAN TLK DOCD: CPT | Mod: S$GLB,,, | Performed by: NURSE PRACTITIONER

## 2021-03-25 PROCEDURE — 1126F PR PAIN SEVERITY QUANTIFIED, NO PAIN PRESENT: ICD-10-PCS | Mod: S$GLB,,, | Performed by: NURSE PRACTITIONER

## 2021-04-06 ENCOUNTER — PATIENT MESSAGE (OUTPATIENT)
Dept: INTERNAL MEDICINE | Facility: CLINIC | Age: 76
End: 2021-04-06

## 2021-04-07 ENCOUNTER — OFFICE VISIT (OUTPATIENT)
Dept: INTERNAL MEDICINE | Facility: CLINIC | Age: 76
End: 2021-04-07
Payer: MEDICARE

## 2021-04-07 ENCOUNTER — TELEPHONE (OUTPATIENT)
Dept: HEMATOLOGY/ONCOLOGY | Facility: CLINIC | Age: 76
End: 2021-04-07

## 2021-04-07 VITALS
DIASTOLIC BLOOD PRESSURE: 74 MMHG | SYSTOLIC BLOOD PRESSURE: 136 MMHG | WEIGHT: 144.81 LBS | BODY MASS INDEX: 23.27 KG/M2 | HEIGHT: 66 IN | HEART RATE: 95 BPM

## 2021-04-07 DIAGNOSIS — E83.19 IRON OVERLOAD: ICD-10-CM

## 2021-04-07 DIAGNOSIS — I15.2 HYPERTENSION ASSOCIATED WITH DIABETES: ICD-10-CM

## 2021-04-07 DIAGNOSIS — R10.13 DYSPEPSIA: ICD-10-CM

## 2021-04-07 DIAGNOSIS — D75.839 THROMBOCYTOSIS: ICD-10-CM

## 2021-04-07 DIAGNOSIS — R41.3 MEMORY IMPAIRMENT: ICD-10-CM

## 2021-04-07 DIAGNOSIS — E11.59 HYPERTENSION ASSOCIATED WITH DIABETES: ICD-10-CM

## 2021-04-07 DIAGNOSIS — Z00.00 ANNUAL PHYSICAL EXAM: Primary | ICD-10-CM

## 2021-04-07 PROCEDURE — 3288F FALL RISK ASSESSMENT DOCD: CPT | Mod: HCNC,CPTII,S$GLB, | Performed by: INTERNAL MEDICINE

## 2021-04-07 PROCEDURE — 3072F PR LOW RISK FOR RETINOPATHY: ICD-10-PCS | Mod: HCNC,S$GLB,, | Performed by: INTERNAL MEDICINE

## 2021-04-07 PROCEDURE — 99397 PER PM REEVAL EST PAT 65+ YR: CPT | Mod: HCNC,S$GLB,, | Performed by: INTERNAL MEDICINE

## 2021-04-07 PROCEDURE — 99999 PR PBB SHADOW E&M-EST. PATIENT-LVL V: CPT | Mod: PBBFAC,HCNC,, | Performed by: INTERNAL MEDICINE

## 2021-04-07 PROCEDURE — 3072F LOW RISK FOR RETINOPATHY: CPT | Mod: HCNC,S$GLB,, | Performed by: INTERNAL MEDICINE

## 2021-04-07 PROCEDURE — 3078F DIAST BP <80 MM HG: CPT | Mod: HCNC,CPTII,S$GLB, | Performed by: INTERNAL MEDICINE

## 2021-04-07 PROCEDURE — 1101F PT FALLS ASSESS-DOCD LE1/YR: CPT | Mod: HCNC,CPTII,S$GLB, | Performed by: INTERNAL MEDICINE

## 2021-04-07 PROCEDURE — 1126F AMNT PAIN NOTED NONE PRSNT: CPT | Mod: HCNC,S$GLB,, | Performed by: INTERNAL MEDICINE

## 2021-04-07 PROCEDURE — 3075F PR MOST RECENT SYSTOLIC BLOOD PRESS GE 130-139MM HG: ICD-10-PCS | Mod: HCNC,CPTII,S$GLB, | Performed by: INTERNAL MEDICINE

## 2021-04-07 PROCEDURE — 3288F PR FALLS RISK ASSESSMENT DOCUMENTED: ICD-10-PCS | Mod: HCNC,CPTII,S$GLB, | Performed by: INTERNAL MEDICINE

## 2021-04-07 PROCEDURE — 99999 PR PBB SHADOW E&M-EST. PATIENT-LVL V: ICD-10-PCS | Mod: PBBFAC,HCNC,, | Performed by: INTERNAL MEDICINE

## 2021-04-07 PROCEDURE — 3078F PR MOST RECENT DIASTOLIC BLOOD PRESSURE < 80 MM HG: ICD-10-PCS | Mod: HCNC,CPTII,S$GLB, | Performed by: INTERNAL MEDICINE

## 2021-04-07 PROCEDURE — 1126F PR PAIN SEVERITY QUANTIFIED, NO PAIN PRESENT: ICD-10-PCS | Mod: HCNC,S$GLB,, | Performed by: INTERNAL MEDICINE

## 2021-04-07 PROCEDURE — 3075F SYST BP GE 130 - 139MM HG: CPT | Mod: HCNC,CPTII,S$GLB, | Performed by: INTERNAL MEDICINE

## 2021-04-07 PROCEDURE — 1101F PR PT FALLS ASSESS DOC 0-1 FALLS W/OUT INJ PAST YR: ICD-10-PCS | Mod: HCNC,CPTII,S$GLB, | Performed by: INTERNAL MEDICINE

## 2021-04-07 PROCEDURE — 99397 PR PREVENTIVE VISIT,EST,65 & OVER: ICD-10-PCS | Mod: HCNC,S$GLB,, | Performed by: INTERNAL MEDICINE

## 2021-04-29 DIAGNOSIS — E78.5 HYPERLIPIDEMIA ASSOCIATED WITH TYPE 2 DIABETES MELLITUS: Chronic | ICD-10-CM

## 2021-04-29 DIAGNOSIS — E11.69 HYPERLIPIDEMIA ASSOCIATED WITH TYPE 2 DIABETES MELLITUS: Chronic | ICD-10-CM

## 2021-05-01 RX ORDER — PRAVASTATIN SODIUM 40 MG/1
TABLET ORAL
Qty: 90 TABLET | Refills: 3 | Status: SHIPPED | OUTPATIENT
Start: 2021-05-01

## 2021-06-25 ENCOUNTER — OFFICE VISIT (OUTPATIENT)
Dept: OPHTHALMOLOGY | Facility: CLINIC | Age: 76
End: 2021-06-25
Payer: MEDICARE

## 2021-06-25 DIAGNOSIS — E11.59 HYPERTENSION ASSOCIATED WITH DIABETES: ICD-10-CM

## 2021-06-25 DIAGNOSIS — H11.003 PTERYGIUM OF BOTH EYES: ICD-10-CM

## 2021-06-25 DIAGNOSIS — H40.1233 LOW-TENSION GLAUCOMA OF BOTH EYES, SEVERE STAGE: Primary | ICD-10-CM

## 2021-06-25 DIAGNOSIS — H26.493 BILATERAL POSTERIOR CAPSULAR OPACIFICATION: ICD-10-CM

## 2021-06-25 DIAGNOSIS — I15.2 HYPERTENSION ASSOCIATED WITH DIABETES: ICD-10-CM

## 2021-06-25 DIAGNOSIS — H26.493 PCO (POSTERIOR CAPSULAR OPACIFICATION), BILATERAL: ICD-10-CM

## 2021-06-25 DIAGNOSIS — H53.413 SCOTOMA, CENTRAL, BILATERAL: ICD-10-CM

## 2021-06-25 DIAGNOSIS — E11.40 TYPE 2 DIABETES MELLITUS WITH DIABETIC NEUROPATHY, UNSPECIFIED WHETHER LONG TERM INSULIN USE: ICD-10-CM

## 2021-06-25 DIAGNOSIS — Z96.1 PSEUDOPHAKIA: ICD-10-CM

## 2021-06-25 PROCEDURE — 99999 PR PBB SHADOW E&M-EST. PATIENT-LVL III: CPT | Mod: PBBFAC,HCNC,, | Performed by: OPHTHALMOLOGY

## 2021-06-25 PROCEDURE — 1126F PR PAIN SEVERITY QUANTIFIED, NO PAIN PRESENT: ICD-10-PCS | Mod: HCNC,S$GLB,, | Performed by: OPHTHALMOLOGY

## 2021-06-25 PROCEDURE — 3288F PR FALLS RISK ASSESSMENT DOCUMENTED: ICD-10-PCS | Mod: HCNC,CPTII,S$GLB, | Performed by: OPHTHALMOLOGY

## 2021-06-25 PROCEDURE — 1101F PR PT FALLS ASSESS DOC 0-1 FALLS W/OUT INJ PAST YR: ICD-10-PCS | Mod: HCNC,CPTII,S$GLB, | Performed by: OPHTHALMOLOGY

## 2021-06-25 PROCEDURE — 99999 PR PBB SHADOW E&M-EST. PATIENT-LVL III: ICD-10-PCS | Mod: PBBFAC,HCNC,, | Performed by: OPHTHALMOLOGY

## 2021-06-25 PROCEDURE — 92012 INTRM OPH EXAM EST PATIENT: CPT | Mod: HCNC,S$GLB,, | Performed by: OPHTHALMOLOGY

## 2021-06-25 PROCEDURE — 1101F PT FALLS ASSESS-DOCD LE1/YR: CPT | Mod: HCNC,CPTII,S$GLB, | Performed by: OPHTHALMOLOGY

## 2021-06-25 PROCEDURE — 1126F AMNT PAIN NOTED NONE PRSNT: CPT | Mod: HCNC,S$GLB,, | Performed by: OPHTHALMOLOGY

## 2021-06-25 PROCEDURE — 3288F FALL RISK ASSESSMENT DOCD: CPT | Mod: HCNC,CPTII,S$GLB, | Performed by: OPHTHALMOLOGY

## 2021-06-25 PROCEDURE — 92012 PR EYE EXAM, EST PATIENT,INTERMED: ICD-10-PCS | Mod: HCNC,S$GLB,, | Performed by: OPHTHALMOLOGY

## 2021-06-25 RX ORDER — BRIMONIDINE TARTRATE 2 MG/ML
1 SOLUTION/ DROPS OPHTHALMIC 3 TIMES DAILY
Qty: 30 ML | Refills: 3 | Status: SHIPPED | OUTPATIENT
Start: 2021-06-25 | End: 2022-08-01

## 2021-06-25 RX ORDER — LATANOPROST 50 UG/ML
1 SOLUTION/ DROPS OPHTHALMIC NIGHTLY
Qty: 7.5 ML | Refills: 3 | Status: SHIPPED | OUTPATIENT
Start: 2021-06-25 | End: 2021-11-01

## 2021-07-16 ENCOUNTER — LAB VISIT (OUTPATIENT)
Dept: LAB | Facility: HOSPITAL | Age: 76
End: 2021-07-16
Payer: MEDICARE

## 2021-07-16 DIAGNOSIS — E83.19 IRON OVERLOAD: ICD-10-CM

## 2021-07-16 DIAGNOSIS — R10.13 DYSPEPSIA: ICD-10-CM

## 2021-07-16 LAB
BASOPHILS # BLD AUTO: 0.11 K/UL (ref 0–0.2)
BASOPHILS NFR BLD: 1.5 % (ref 0–1.9)
DIFFERENTIAL METHOD: ABNORMAL
EOSINOPHIL # BLD AUTO: 0.4 K/UL (ref 0–0.5)
EOSINOPHIL NFR BLD: 5.2 % (ref 0–8)
ERYTHROCYTE [DISTWIDTH] IN BLOOD BY AUTOMATED COUNT: 15.7 % (ref 11.5–14.5)
FERRITIN SERPL-MCNC: 11 NG/ML (ref 20–300)
HCT VFR BLD AUTO: 36.1 % (ref 40–54)
HGB BLD-MCNC: 11.1 G/DL (ref 14–18)
IMM GRANULOCYTES # BLD AUTO: 0.01 K/UL (ref 0–0.04)
IMM GRANULOCYTES NFR BLD AUTO: 0.1 % (ref 0–0.5)
IRON SERPL-MCNC: 64 UG/DL (ref 45–160)
LYMPHOCYTES # BLD AUTO: 2 K/UL (ref 1–4.8)
LYMPHOCYTES NFR BLD: 27 % (ref 18–48)
MCH RBC QN AUTO: 27 PG (ref 27–31)
MCHC RBC AUTO-ENTMCNC: 30.7 G/DL (ref 32–36)
MCV RBC AUTO: 88 FL (ref 82–98)
MONOCYTES # BLD AUTO: 0.9 K/UL (ref 0.3–1)
MONOCYTES NFR BLD: 11.9 % (ref 4–15)
NEUTROPHILS # BLD AUTO: 4 K/UL (ref 1.8–7.7)
NEUTROPHILS NFR BLD: 54.3 % (ref 38–73)
NRBC BLD-RTO: 0 /100 WBC
PLATELET # BLD AUTO: 549 K/UL (ref 150–450)
PMV BLD AUTO: 9.4 FL (ref 9.2–12.9)
RBC # BLD AUTO: 4.11 M/UL (ref 4.6–6.2)
SATURATED IRON: 13 % (ref 20–50)
TOTAL IRON BINDING CAPACITY: 502 UG/DL (ref 250–450)
TRANSFERRIN SERPL-MCNC: 339 MG/DL (ref 200–375)
WBC # BLD AUTO: 7.3 K/UL (ref 3.9–12.7)

## 2021-07-16 PROCEDURE — 36415 COLL VENOUS BLD VENIPUNCTURE: CPT | Mod: HCNC | Performed by: INTERNAL MEDICINE

## 2021-07-16 PROCEDURE — 83540 ASSAY OF IRON: CPT | Mod: HCNC | Performed by: INTERNAL MEDICINE

## 2021-07-16 PROCEDURE — 85025 COMPLETE CBC W/AUTO DIFF WBC: CPT | Mod: HCNC | Performed by: INTERNAL MEDICINE

## 2021-07-16 PROCEDURE — 82728 ASSAY OF FERRITIN: CPT | Mod: HCNC | Performed by: INTERNAL MEDICINE

## 2021-07-16 PROCEDURE — 86677 HELICOBACTER PYLORI ANTIBODY: CPT | Mod: HCNC | Performed by: INTERNAL MEDICINE

## 2021-07-20 LAB — H PYLORI IGG SERPL QL IA: POSITIVE

## 2021-07-23 ENCOUNTER — OFFICE VISIT (OUTPATIENT)
Dept: INTERNAL MEDICINE | Facility: CLINIC | Age: 76
End: 2021-07-23
Payer: MEDICARE

## 2021-07-23 VITALS
WEIGHT: 148.38 LBS | HEART RATE: 81 BPM | SYSTOLIC BLOOD PRESSURE: 112 MMHG | DIASTOLIC BLOOD PRESSURE: 82 MMHG | OXYGEN SATURATION: 99 % | BODY MASS INDEX: 23.84 KG/M2 | HEIGHT: 66 IN

## 2021-07-23 DIAGNOSIS — E11.59 HYPERTENSION ASSOCIATED WITH DIABETES: ICD-10-CM

## 2021-07-23 DIAGNOSIS — E11.9 TYPE 2 DIABETES MELLITUS WITHOUT COMPLICATION, WITHOUT LONG-TERM CURRENT USE OF INSULIN: ICD-10-CM

## 2021-07-23 DIAGNOSIS — E11.69 HYPERLIPIDEMIA ASSOCIATED WITH TYPE 2 DIABETES MELLITUS: ICD-10-CM

## 2021-07-23 DIAGNOSIS — I15.2 HYPERTENSION ASSOCIATED WITH DIABETES: ICD-10-CM

## 2021-07-23 DIAGNOSIS — I10 ESSENTIAL HYPERTENSION: Primary | ICD-10-CM

## 2021-07-23 DIAGNOSIS — E78.5 HYPERLIPIDEMIA ASSOCIATED WITH TYPE 2 DIABETES MELLITUS: ICD-10-CM

## 2021-07-23 PROCEDURE — 1101F PT FALLS ASSESS-DOCD LE1/YR: CPT | Mod: HCNC,CPTII,S$GLB, | Performed by: FAMILY MEDICINE

## 2021-07-23 PROCEDURE — 3074F SYST BP LT 130 MM HG: CPT | Mod: HCNC,CPTII,S$GLB, | Performed by: FAMILY MEDICINE

## 2021-07-23 PROCEDURE — 99499 RISK ADDL DX/OHS AUDIT: ICD-10-PCS | Mod: S$GLB,,, | Performed by: FAMILY MEDICINE

## 2021-07-23 PROCEDURE — 3079F PR MOST RECENT DIASTOLIC BLOOD PRESSURE 80-89 MM HG: ICD-10-PCS | Mod: HCNC,CPTII,S$GLB, | Performed by: FAMILY MEDICINE

## 2021-07-23 PROCEDURE — 99499 UNLISTED E&M SERVICE: CPT | Mod: S$GLB,,, | Performed by: FAMILY MEDICINE

## 2021-07-23 PROCEDURE — 1101F PR PT FALLS ASSESS DOC 0-1 FALLS W/OUT INJ PAST YR: ICD-10-PCS | Mod: HCNC,CPTII,S$GLB, | Performed by: FAMILY MEDICINE

## 2021-07-23 PROCEDURE — 3288F FALL RISK ASSESSMENT DOCD: CPT | Mod: HCNC,CPTII,S$GLB, | Performed by: FAMILY MEDICINE

## 2021-07-23 PROCEDURE — 1159F MED LIST DOCD IN RCRD: CPT | Mod: HCNC,CPTII,S$GLB, | Performed by: FAMILY MEDICINE

## 2021-07-23 PROCEDURE — 99999 PR PBB SHADOW E&M-EST. PATIENT-LVL IV: CPT | Mod: PBBFAC,HCNC,, | Performed by: FAMILY MEDICINE

## 2021-07-23 PROCEDURE — 3072F LOW RISK FOR RETINOPATHY: CPT | Mod: HCNC,CPTII,S$GLB, | Performed by: FAMILY MEDICINE

## 2021-07-23 PROCEDURE — 3072F PR LOW RISK FOR RETINOPATHY: ICD-10-PCS | Mod: HCNC,CPTII,S$GLB, | Performed by: FAMILY MEDICINE

## 2021-07-23 PROCEDURE — 99214 OFFICE O/P EST MOD 30 MIN: CPT | Mod: HCNC,S$GLB,, | Performed by: FAMILY MEDICINE

## 2021-07-23 PROCEDURE — 1126F PR PAIN SEVERITY QUANTIFIED, NO PAIN PRESENT: ICD-10-PCS | Mod: HCNC,CPTII,S$GLB, | Performed by: FAMILY MEDICINE

## 2021-07-23 PROCEDURE — 3074F PR MOST RECENT SYSTOLIC BLOOD PRESSURE < 130 MM HG: ICD-10-PCS | Mod: HCNC,CPTII,S$GLB, | Performed by: FAMILY MEDICINE

## 2021-07-23 PROCEDURE — 99214 PR OFFICE/OUTPT VISIT, EST, LEVL IV, 30-39 MIN: ICD-10-PCS | Mod: HCNC,S$GLB,, | Performed by: FAMILY MEDICINE

## 2021-07-23 PROCEDURE — 3079F DIAST BP 80-89 MM HG: CPT | Mod: HCNC,CPTII,S$GLB, | Performed by: FAMILY MEDICINE

## 2021-07-23 PROCEDURE — 1159F PR MEDICATION LIST DOCUMENTED IN MEDICAL RECORD: ICD-10-PCS | Mod: HCNC,CPTII,S$GLB, | Performed by: FAMILY MEDICINE

## 2021-07-23 PROCEDURE — 99999 PR PBB SHADOW E&M-EST. PATIENT-LVL IV: ICD-10-PCS | Mod: PBBFAC,HCNC,, | Performed by: FAMILY MEDICINE

## 2021-07-23 PROCEDURE — 3288F PR FALLS RISK ASSESSMENT DOCUMENTED: ICD-10-PCS | Mod: HCNC,CPTII,S$GLB, | Performed by: FAMILY MEDICINE

## 2021-07-23 PROCEDURE — 1126F AMNT PAIN NOTED NONE PRSNT: CPT | Mod: HCNC,CPTII,S$GLB, | Performed by: FAMILY MEDICINE

## 2021-07-28 ENCOUNTER — OFFICE VISIT (OUTPATIENT)
Dept: NEUROLOGY | Facility: CLINIC | Age: 76
End: 2021-07-28
Payer: MEDICARE

## 2021-07-28 DIAGNOSIS — R41.3 MEMORY CHANGE: Primary | ICD-10-CM

## 2021-07-28 PROCEDURE — 99499 UNLISTED E&M SERVICE: CPT | Mod: HCNC,95,, | Performed by: CLINICAL NEUROPSYCHOLOGIST

## 2021-07-28 PROCEDURE — 99499 NO LOS: ICD-10-PCS | Mod: HCNC,95,, | Performed by: CLINICAL NEUROPSYCHOLOGIST

## 2021-07-28 PROCEDURE — 96116 NUBHVL XM PHYS/QHP 1ST HR: CPT | Mod: HCNC,95,, | Performed by: CLINICAL NEUROPSYCHOLOGIST

## 2021-07-28 PROCEDURE — 3072F PR LOW RISK FOR RETINOPATHY: ICD-10-PCS | Mod: HCNC,CPTII,95, | Performed by: CLINICAL NEUROPSYCHOLOGIST

## 2021-07-28 PROCEDURE — 96116 PR NEUROBEHAVIORAL STATUS EXAM BY PSYCH/PHYS: ICD-10-PCS | Mod: HCNC,95,, | Performed by: CLINICAL NEUROPSYCHOLOGIST

## 2021-07-28 PROCEDURE — 3072F LOW RISK FOR RETINOPATHY: CPT | Mod: HCNC,CPTII,95, | Performed by: CLINICAL NEUROPSYCHOLOGIST

## 2021-08-16 ENCOUNTER — OFFICE VISIT (OUTPATIENT)
Dept: NEUROLOGY | Facility: CLINIC | Age: 76
End: 2021-08-16
Payer: MEDICARE

## 2021-08-16 DIAGNOSIS — F10.20 UNCOMPLICATED ALCOHOL DEPENDENCE: ICD-10-CM

## 2021-08-16 DIAGNOSIS — R41.3 MEMORY IMPAIRMENT: Primary | ICD-10-CM

## 2021-08-16 PROCEDURE — 96139 PSYCL/NRPSYC TST TECH EA: CPT | Mod: HCNC,S$GLB,, | Performed by: CLINICAL NEUROPSYCHOLOGIST

## 2021-08-16 PROCEDURE — 99999 PR PBB SHADOW E&M-EST. PATIENT-LVL I: ICD-10-PCS | Mod: PBBFAC,HCNC,, | Performed by: CLINICAL NEUROPSYCHOLOGIST

## 2021-08-16 PROCEDURE — 96138 PR PSYCH/NEUROPSYCH TEST ADMIN/SCORING, BY TECH, 2+ TESTS, 1ST 30 MIN: ICD-10-PCS | Mod: HCNC,S$GLB,, | Performed by: CLINICAL NEUROPSYCHOLOGIST

## 2021-08-16 PROCEDURE — 96132 PR NEUROPSYCHOLOGIC TEST EVAL SVCS, 1ST HR: ICD-10-PCS | Mod: HCNC,S$GLB,, | Performed by: CLINICAL NEUROPSYCHOLOGIST

## 2021-08-16 PROCEDURE — 96132 NRPSYC TST EVAL PHYS/QHP 1ST: CPT | Mod: HCNC,S$GLB,, | Performed by: CLINICAL NEUROPSYCHOLOGIST

## 2021-08-16 PROCEDURE — 96139 PR PSYCH/NEUROPSYCH TEST ADMIN/SCORING, BY TECH, 2+ TESTS, EA ADDTL 30 MIN: ICD-10-PCS | Mod: HCNC,S$GLB,, | Performed by: CLINICAL NEUROPSYCHOLOGIST

## 2021-08-16 PROCEDURE — 99499 NO LOS: ICD-10-PCS | Mod: HCNC,S$GLB,, | Performed by: CLINICAL NEUROPSYCHOLOGIST

## 2021-08-16 PROCEDURE — 96133 PR NEUROPSYCHOLOGIC TEST EVAL SVCS, EA ADDTL HR: ICD-10-PCS | Mod: HCNC,S$GLB,, | Performed by: CLINICAL NEUROPSYCHOLOGIST

## 2021-08-16 PROCEDURE — 99499 UNLISTED E&M SERVICE: CPT | Mod: HCNC,S$GLB,, | Performed by: CLINICAL NEUROPSYCHOLOGIST

## 2021-08-16 PROCEDURE — 96133 NRPSYC TST EVAL PHYS/QHP EA: CPT | Mod: HCNC,S$GLB,, | Performed by: CLINICAL NEUROPSYCHOLOGIST

## 2021-08-16 PROCEDURE — 96138 PSYCL/NRPSYC TECH 1ST: CPT | Mod: HCNC,S$GLB,, | Performed by: CLINICAL NEUROPSYCHOLOGIST

## 2021-08-16 PROCEDURE — 3072F LOW RISK FOR RETINOPATHY: CPT | Mod: HCNC,CPTII,S$GLB, | Performed by: CLINICAL NEUROPSYCHOLOGIST

## 2021-08-16 PROCEDURE — 99999 PR PBB SHADOW E&M-EST. PATIENT-LVL I: CPT | Mod: PBBFAC,HCNC,, | Performed by: CLINICAL NEUROPSYCHOLOGIST

## 2021-08-16 PROCEDURE — 3072F PR LOW RISK FOR RETINOPATHY: ICD-10-PCS | Mod: HCNC,CPTII,S$GLB, | Performed by: CLINICAL NEUROPSYCHOLOGIST

## 2021-08-26 ENCOUNTER — PATIENT MESSAGE (OUTPATIENT)
Dept: NEUROLOGY | Facility: CLINIC | Age: 76
End: 2021-08-26

## 2021-09-10 ENCOUNTER — TELEPHONE (OUTPATIENT)
Dept: NEUROLOGY | Facility: CLINIC | Age: 76
End: 2021-09-10

## 2021-10-04 ENCOUNTER — TELEPHONE (OUTPATIENT)
Dept: NEUROLOGY | Facility: CLINIC | Age: 76
End: 2021-10-04

## 2022-01-24 ENCOUNTER — PATIENT MESSAGE (OUTPATIENT)
Dept: INTERNAL MEDICINE | Facility: CLINIC | Age: 77
End: 2022-01-24
Payer: MEDICARE

## 2022-04-20 ENCOUNTER — OFFICE VISIT (OUTPATIENT)
Dept: INTERNAL MEDICINE | Facility: CLINIC | Age: 77
End: 2022-04-20
Payer: MEDICARE

## 2022-04-20 ENCOUNTER — HOSPITAL ENCOUNTER (OUTPATIENT)
Dept: RADIOLOGY | Facility: HOSPITAL | Age: 77
Discharge: HOME | End: 2022-04-20
Attending: FAMILY MEDICINE
Payer: MEDICARE

## 2022-04-20 VITALS
SYSTOLIC BLOOD PRESSURE: 152 MMHG | OXYGEN SATURATION: 99 % | DIASTOLIC BLOOD PRESSURE: 88 MMHG | HEIGHT: 66 IN | WEIGHT: 133.81 LBS | BODY MASS INDEX: 21.51 KG/M2 | HEART RATE: 87 BPM

## 2022-04-20 DIAGNOSIS — I15.2 HYPERTENSION ASSOCIATED WITH DIABETES: ICD-10-CM

## 2022-04-20 DIAGNOSIS — E11.9 TYPE 2 DIABETES MELLITUS WITHOUT COMPLICATION, WITHOUT LONG-TERM CURRENT USE OF INSULIN: ICD-10-CM

## 2022-04-20 DIAGNOSIS — D47.3 ESSENTIAL (HEMORRHAGIC) THROMBOCYTHEMIA: ICD-10-CM

## 2022-04-20 DIAGNOSIS — R06.2 WHEEZING: ICD-10-CM

## 2022-04-20 DIAGNOSIS — E11.59 HYPERTENSION ASSOCIATED WITH DIABETES: ICD-10-CM

## 2022-04-20 DIAGNOSIS — R06.2 WHEEZING: Primary | ICD-10-CM

## 2022-04-20 DIAGNOSIS — R06.83 SNORING: ICD-10-CM

## 2022-04-20 PROCEDURE — 99499 UNLISTED E&M SERVICE: CPT | Mod: S$GLB,,, | Performed by: FAMILY MEDICINE

## 2022-04-20 PROCEDURE — 3079F DIAST BP 80-89 MM HG: CPT | Mod: CPTII,S$GLB,, | Performed by: FAMILY MEDICINE

## 2022-04-20 PROCEDURE — 1159F PR MEDICATION LIST DOCUMENTED IN MEDICAL RECORD: ICD-10-PCS | Mod: CPTII,S$GLB,, | Performed by: FAMILY MEDICINE

## 2022-04-20 PROCEDURE — 99214 PR OFFICE/OUTPT VISIT, EST, LEVL IV, 30-39 MIN: ICD-10-PCS | Mod: S$GLB,,, | Performed by: FAMILY MEDICINE

## 2022-04-20 PROCEDURE — 71046 X-RAY EXAM CHEST 2 VIEWS: CPT | Mod: 26,,, | Performed by: RADIOLOGY

## 2022-04-20 PROCEDURE — 3288F FALL RISK ASSESSMENT DOCD: CPT | Mod: CPTII,S$GLB,, | Performed by: FAMILY MEDICINE

## 2022-04-20 PROCEDURE — 1101F PR PT FALLS ASSESS DOC 0-1 FALLS W/OUT INJ PAST YR: ICD-10-PCS | Mod: CPTII,S$GLB,, | Performed by: FAMILY MEDICINE

## 2022-04-20 PROCEDURE — 71046 X-RAY EXAM CHEST 2 VIEWS: CPT | Mod: TC

## 2022-04-20 PROCEDURE — 99214 OFFICE O/P EST MOD 30 MIN: CPT | Mod: S$GLB,,, | Performed by: FAMILY MEDICINE

## 2022-04-20 PROCEDURE — 99999 PR PBB SHADOW E&M-EST. PATIENT-LVL V: ICD-10-PCS | Mod: PBBFAC,,, | Performed by: FAMILY MEDICINE

## 2022-04-20 PROCEDURE — 3288F PR FALLS RISK ASSESSMENT DOCUMENTED: ICD-10-PCS | Mod: CPTII,S$GLB,, | Performed by: FAMILY MEDICINE

## 2022-04-20 PROCEDURE — 1126F AMNT PAIN NOTED NONE PRSNT: CPT | Mod: CPTII,S$GLB,, | Performed by: FAMILY MEDICINE

## 2022-04-20 PROCEDURE — 1160F RVW MEDS BY RX/DR IN RCRD: CPT | Mod: CPTII,S$GLB,, | Performed by: FAMILY MEDICINE

## 2022-04-20 PROCEDURE — 1159F MED LIST DOCD IN RCRD: CPT | Mod: CPTII,S$GLB,, | Performed by: FAMILY MEDICINE

## 2022-04-20 PROCEDURE — 71046 XR CHEST PA AND LATERAL: ICD-10-PCS | Mod: 26,,, | Performed by: RADIOLOGY

## 2022-04-20 PROCEDURE — 3079F PR MOST RECENT DIASTOLIC BLOOD PRESSURE 80-89 MM HG: ICD-10-PCS | Mod: CPTII,S$GLB,, | Performed by: FAMILY MEDICINE

## 2022-04-20 PROCEDURE — 1126F PR PAIN SEVERITY QUANTIFIED, NO PAIN PRESENT: ICD-10-PCS | Mod: CPTII,S$GLB,, | Performed by: FAMILY MEDICINE

## 2022-04-20 PROCEDURE — 1160F PR REVIEW ALL MEDS BY PRESCRIBER/CLIN PHARMACIST DOCUMENTED: ICD-10-PCS | Mod: CPTII,S$GLB,, | Performed by: FAMILY MEDICINE

## 2022-04-20 PROCEDURE — 1101F PT FALLS ASSESS-DOCD LE1/YR: CPT | Mod: CPTII,S$GLB,, | Performed by: FAMILY MEDICINE

## 2022-04-20 PROCEDURE — 99499 RISK ADDL DX/OHS AUDIT: ICD-10-PCS | Mod: S$GLB,,, | Performed by: FAMILY MEDICINE

## 2022-04-20 PROCEDURE — 99999 PR PBB SHADOW E&M-EST. PATIENT-LVL V: CPT | Mod: PBBFAC,,, | Performed by: FAMILY MEDICINE

## 2022-04-20 PROCEDURE — 3077F PR MOST RECENT SYSTOLIC BLOOD PRESSURE >= 140 MM HG: ICD-10-PCS | Mod: CPTII,S$GLB,, | Performed by: FAMILY MEDICINE

## 2022-04-20 PROCEDURE — 3077F SYST BP >= 140 MM HG: CPT | Mod: CPTII,S$GLB,, | Performed by: FAMILY MEDICINE

## 2022-04-20 RX ORDER — METHYLPREDNISOLONE 4 MG/1
TABLET ORAL
Qty: 1 EACH | Refills: 0 | Status: SHIPPED | OUTPATIENT
Start: 2022-04-20 | End: 2022-08-01 | Stop reason: ALTCHOICE

## 2022-04-20 NOTE — PATIENT INSTRUCTIONS
Below are suggestions for symptomatic relief for cold and cough symptoms:                 - Tylenol every 4 hours OR ibuprofen every 6 hours as needed for pain/fever.              - Salt water gargles to soothe throat pain.              - Chloroseptic spray also helps to numb throat pain.              - Nasal saline spray reduces inflammation and dryness.              - Warm face compresses to help with facial sinus pain/pressure.              - Vicks vapor rub at night.              - Flonase OTC or Nasacort OTC for nasal congestion.              - Simple foods like chicken noodle soup.              - Delsym helps with coughing at night              - Zyrtec/Claritin during the day & Benadryl at night may help with allergies.     If you DO NOT have Hypertension or any history of palpitations, it is ok to take over the counter Sudafed or Mucinex D or Allegra-D or Claritin-D or Zyrtec-D.  If you do take one of the above, it is ok to combine that with plain over the counter Mucinex or Allegra or Claritin or Zyrtec. If, for example, you are taking Zyrtec -D, you can combine that with Mucinex, but not Mucinex-D.  If you are taking Mucinex-D, you can combine that with plain Allegra or Claritin or Zyrtec.   If you DO have Hypertension or palpitations, it is safe to take Coricidin HBP for relief of sinus symptoms.

## 2022-04-20 NOTE — PROGRESS NOTES
Subjective:       Patient ID: Yong Harmon is a 76 y.o. male.    Chief Complaint: Nasal Congestion    Shortness of Breath  This is a new problem. The current episode started more than 1 month ago. The problem occurs intermittently. The problem has been waxing and waning. Associated symptoms include coryza, headaches, orthopnea, PND, rhinorrhea, sputum production and wheezing. Pertinent negatives include no abdominal pain, chest pain, claudication, ear pain, fever, hemoptysis, leg pain, leg swelling, neck pain, rash, sore throat, swollen glands, syncope or vomiting. The symptoms are aggravated by any activity. The patient has no known risk factors for DVT/PE. He has tried OTC cough suppressants for the symptoms. The treatment provided moderate relief. His past medical history is significant for allergies. There is no history of aspirin allergies, asthma, bronchiolitis, CAD, chronic lung disease, COPD, DVT, a heart failure, PE, pneumonia or a recent surgery.     Yong Harmon is a 76 y.o. male for prob focused visit.     Here with wife.    Chest congestion, wheezing. Intermittent symptoms x ~3months. Sneezing, rhinorrhea, congestion. Wondering if had covid originally in 1/2022, did not test. No fever.  Taking mucinex which orig seemed like it was working but symptoms would return when stopped med.   Dyspnea with exertion. Feels wheezy after 100-200 ft. Able to lay down flat for sleep, denies leg swelling, orthopnea, pnd. Does snore chronically.     #Cards: HTN, HLD  - reg: Pravastatin 40 qd  - not currently taking anti-bp regimen, h/o hypotension w/ meds  - checks bp at home     #Endo: DM (A1c 3/2021 = 5.2)  - eye exam 6/2021, est w/ ophtho  - foot exam 3/2021  - urine due     #GI: H/o H. Pylori infxn per pt report, Dyspepsia  - relief w/ pepcid     #Heme: Anemia, Thrombocytosis  - est w/ Dr. Coker / Dr. Leal, lov 11/2019  - takes iron - constipates; rec to take 3xwkly     #Ophtho: Glaucoma  - est w/   jose Grace 6/25/21 --> f/u      #Memory changes  - est w/ [facility] / Dr. Killian,  8/2021    Review of Systems   Constitutional: Negative for fever.   HENT: Positive for rhinorrhea. Negative for ear pain and sore throat.    Respiratory: Positive for sputum production, shortness of breath and wheezing. Negative for hemoptysis.    Cardiovascular: Positive for orthopnea and PND. Negative for chest pain, claudication, leg swelling and syncope.   Gastrointestinal: Negative for abdominal pain and vomiting.   Musculoskeletal: Negative for neck pain.   Skin: Negative for rash.   Neurological: Positive for headaches.         Past Medical History:   Diagnosis Date    Alcohol dependence     Diabetes mellitus type II     Diabetic polyneuropathy associated with type 2 diabetes mellitus 3/21/2016    Diverticulosis of colon 2014    Glaucoma     Hyperlipidemia     Hypertension     Stage 2 chronic kidney disease 10/11/2018    Stage 2 chronic kidney disease due to type 2 diabetes mellitus     Type 2 diabetes mellitus with ophthalmic manifestations         Prior to Admission medications    Medication Sig Start Date End Date Taking? Authorizing Provider   ascorbic acid (VITAMIN C) 500 MG tablet Take 500 mg by mouth once daily.   Yes Historical Provider   aspirin (ECOTRIN) 81 MG EC tablet Take 81 mg by mouth once daily.   Yes Historical Provider   brimonidine 0.2% (ALPHAGAN) 0.2 % Drop Place 1 drop into both eyes 3 (three) times daily. 6/25/21  Yes Lona Grace MD   CALCIUM CARBONATE/VITAMIN D3 (CALCIUM 500 + D ORAL) Take 1 tablet by mouth once daily.   Yes Historical Provider   cyanocobalamin (VITAMIN B-12) 100 MCG tablet Take 100 mcg by mouth once daily.   Yes Historical Provider   fish oil-omega-3 fatty acids 300-1,000 mg capsule Take 1 capsule by mouth once daily.    Yes Historical Provider   latanoprost 0.005 % ophthalmic solution PLACE 1 DROP INTO BOTH EYES EVERY EVENING. 11/1/21  Yes Lona  "MD Sanjay   MULTIVIT-MIN/FA/LYCOPENE/LUT (CENTRUM SILVER ULTRA MEN'S ORAL) Take 1 tablet by mouth once daily.   Yes Historical Provider   potassium 99 mg Tab Take 1 tablet by mouth once daily. Using OTC potassium   Yes Historical Provider   pravastatin (PRAVACHOL) 40 MG tablet TAKE 1 TABLET BY MOUTH EVERY DAY 5/1/21  Yes Naresh Verdin MD   vitamin E 100 UNIT capsule Take 100 Units by mouth once daily.   Yes Historical Provider   chlorhexidine (PERIDEX) 0.12 % solution  3/9/17   Historical Provider   econazole nitrate 1 % cream Apply topically once daily. 8/6/20   Naresh Verdin MD        Past medical history, surgical history, and family medical history reviewed and updated as appropriate.    Medications and allergies reviewed.     Objective:          Vitals:    04/20/22 1426   BP: (!) 152/88   BP Location: Left arm   Patient Position: Sitting   BP Method: Medium (Manual)   Pulse: 87   SpO2: 99%   Weight: 60.7 kg (133 lb 13.1 oz)   Height: 5' 6" (1.676 m)     Body mass index is 21.6 kg/m².  Physical Exam  Vitals and nursing note reviewed.   Constitutional:       General: He is not in acute distress.     Appearance: He is not ill-appearing, toxic-appearing or diaphoretic.   Cardiovascular:      Rate and Rhythm: Normal rate and regular rhythm.   Pulmonary:      Effort: Pulmonary effort is normal. No respiratory distress.      Breath sounds: Wheezing (left lung fields) present.   Abdominal:      Palpations: Abdomen is soft.   Neurological:      Mental Status: He is alert and oriented to person, place, and time.   Psychiatric:         Mood and Affect: Mood normal.         Behavior: Behavior normal.         Lab Results   Component Value Date    WBC 7.30 07/16/2021    HGB 11.1 (L) 07/16/2021    HCT 36.1 (L) 07/16/2021     (H) 07/16/2021    CHOL 197 03/25/2021    TRIG 75 03/25/2021    HDL 77 (H) 03/25/2021    ALT 15 03/25/2021    AST 18 03/25/2021     03/25/2021    K 4.4 03/25/2021     03/25/2021    " CREATININE 0.9 03/25/2021    BUN 10 03/25/2021    CO2 28 03/25/2021    TSH 2.073 03/21/2016    PSA 1.7 03/21/2016    HGBA1C 5.2 03/25/2021       Assessment:       1. Wheezing    2. Snoring    3. Type 2 diabetes mellitus without complication, without long-term current use of insulin    4. Hypertension associated with diabetes    5. Essential (hemorrhagic) thrombocythemia          Plan:     Problem List Items Addressed This Visit        Cardiac/Vascular    Hypertension associated with diabetes    Overview     Close monitoring              Endocrine    Type 2 diabetes mellitus without complication, without long-term current use of insulin    Overview     Questionable history, maybe diagnosed many years ago, a1c well controlled, not curr on meds for glucose control and healthy lifestyle encouraged, rec statin regularly.             Other Visit Diagnoses     Wheezing    -  Primary    Snoring        Essential (hemorrhagic) thrombocythemia              Wheezing appreciated on exam. No distress, normal O2. Will further investigate with imaging and pfts. Steroid pack in short term with otc recs made. Labs today    Follow up should symptoms persist or worsen. If symptoms become severe, please report immediately to urgent care or emergency room for further evaluation. Patient voiced understanding.      Follow up in about 6 weeks (around 6/1/2022) for Checkup, Annual.    Randal Spence MD  Family Medicine / Primary Care  [facility]  4/20/2022

## 2022-04-26 ENCOUNTER — HOSPITAL ENCOUNTER (OUTPATIENT)
Dept: PULMONOLOGY | Facility: CLINIC | Age: 77
Discharge: HOME | End: 2022-04-26
Payer: MEDICARE

## 2022-04-26 DIAGNOSIS — R06.2 WHEEZING: ICD-10-CM

## 2022-04-26 PROCEDURE — 94729 DIFFUSING CAPACITY: CPT | Mod: S$GLB,,, | Performed by: INTERNAL MEDICINE

## 2022-04-26 PROCEDURE — 94010 BREATHING CAPACITY TEST: CPT | Mod: S$GLB,,, | Performed by: INTERNAL MEDICINE

## 2022-04-26 PROCEDURE — 94727 GAS DIL/WSHOT DETER LNG VOL: CPT | Mod: S$GLB,,, | Performed by: INTERNAL MEDICINE

## 2022-04-26 PROCEDURE — 94729 PR C02/MEMBANE DIFFUSE CAPACITY: ICD-10-PCS | Mod: S$GLB,,, | Performed by: INTERNAL MEDICINE

## 2022-04-26 PROCEDURE — 94010 BREATHING CAPACITY TEST: ICD-10-PCS | Mod: S$GLB,,, | Performed by: INTERNAL MEDICINE

## 2022-04-26 PROCEDURE — 94727 PR PULM FUNCTION TEST BY GAS: ICD-10-PCS | Mod: S$GLB,,, | Performed by: INTERNAL MEDICINE

## 2022-08-01 ENCOUNTER — OFFICE VISIT (OUTPATIENT)
Dept: OPHTHALMOLOGY | Facility: CLINIC | Age: 77
End: 2022-08-01
Payer: MEDICARE

## 2022-08-01 ENCOUNTER — CLINICAL SUPPORT (OUTPATIENT)
Dept: OPHTHALMOLOGY | Facility: CLINIC | Age: 77
End: 2022-08-01
Payer: MEDICARE

## 2022-08-01 DIAGNOSIS — H26.493 BILATERAL POSTERIOR CAPSULAR OPACIFICATION: ICD-10-CM

## 2022-08-01 DIAGNOSIS — H11.003 PTERYGIUM OF BOTH EYES: ICD-10-CM

## 2022-08-01 DIAGNOSIS — Z96.1 PSEUDOPHAKIA: ICD-10-CM

## 2022-08-01 DIAGNOSIS — H26.493 PCO (POSTERIOR CAPSULAR OPACIFICATION), BILATERAL: ICD-10-CM

## 2022-08-01 DIAGNOSIS — H40.1233 LOW-TENSION GLAUCOMA OF BOTH EYES, SEVERE STAGE: Primary | ICD-10-CM

## 2022-08-01 DIAGNOSIS — E11.59 HYPERTENSION ASSOCIATED WITH DIABETES: ICD-10-CM

## 2022-08-01 DIAGNOSIS — H11.001 PTERYGIUM EYE, RIGHT: ICD-10-CM

## 2022-08-01 DIAGNOSIS — H53.413 SCOTOMA, CENTRAL, BILATERAL: ICD-10-CM

## 2022-08-01 DIAGNOSIS — I15.2 HYPERTENSION ASSOCIATED WITH DIABETES: ICD-10-CM

## 2022-08-01 PROCEDURE — 92014 PR EYE EXAM, EST PATIENT,COMPREHESV: ICD-10-PCS | Mod: S$GLB,,, | Performed by: OPHTHALMOLOGY

## 2022-08-01 PROCEDURE — 1101F PR PT FALLS ASSESS DOC 0-1 FALLS W/OUT INJ PAST YR: ICD-10-PCS | Mod: CPTII,S$GLB,, | Performed by: OPHTHALMOLOGY

## 2022-08-01 PROCEDURE — 92133 POSTERIOR SEGMENT OCT OPTIC NERVE(OCULAR COHERENCE TOMOGRAPHY) - OU - BOTH EYES: ICD-10-PCS | Mod: S$GLB,,, | Performed by: OPHTHALMOLOGY

## 2022-08-01 PROCEDURE — 99999 PR PBB SHADOW E&M-EST. PATIENT-LVL II: ICD-10-PCS | Mod: PBBFAC,,, | Performed by: OPHTHALMOLOGY

## 2022-08-01 PROCEDURE — 92014 COMPRE OPH EXAM EST PT 1/>: CPT | Mod: S$GLB,,, | Performed by: OPHTHALMOLOGY

## 2022-08-01 PROCEDURE — 1160F PR REVIEW ALL MEDS BY PRESCRIBER/CLIN PHARMACIST DOCUMENTED: ICD-10-PCS | Mod: CPTII,S$GLB,, | Performed by: OPHTHALMOLOGY

## 2022-08-01 PROCEDURE — 1159F PR MEDICATION LIST DOCUMENTED IN MEDICAL RECORD: ICD-10-PCS | Mod: CPTII,S$GLB,, | Performed by: OPHTHALMOLOGY

## 2022-08-01 PROCEDURE — 92133 CPTRZD OPH DX IMG PST SGM ON: CPT | Mod: S$GLB,,, | Performed by: OPHTHALMOLOGY

## 2022-08-01 PROCEDURE — 2023F DILAT RTA XM W/O RTNOPTHY: CPT | Mod: CPTII,S$GLB,, | Performed by: OPHTHALMOLOGY

## 2022-08-01 PROCEDURE — 92083 HUMPHREY VISUAL FIELD - OU - BOTH EYES: ICD-10-PCS | Mod: S$GLB,,, | Performed by: OPHTHALMOLOGY

## 2022-08-01 PROCEDURE — 3288F FALL RISK ASSESSMENT DOCD: CPT | Mod: CPTII,S$GLB,, | Performed by: OPHTHALMOLOGY

## 2022-08-01 PROCEDURE — 3288F PR FALLS RISK ASSESSMENT DOCUMENTED: ICD-10-PCS | Mod: CPTII,S$GLB,, | Performed by: OPHTHALMOLOGY

## 2022-08-01 PROCEDURE — 1159F MED LIST DOCD IN RCRD: CPT | Mod: CPTII,S$GLB,, | Performed by: OPHTHALMOLOGY

## 2022-08-01 PROCEDURE — 1126F AMNT PAIN NOTED NONE PRSNT: CPT | Mod: CPTII,S$GLB,, | Performed by: OPHTHALMOLOGY

## 2022-08-01 PROCEDURE — 99999 PR PBB SHADOW E&M-EST. PATIENT-LVL II: CPT | Mod: PBBFAC,,, | Performed by: OPHTHALMOLOGY

## 2022-08-01 PROCEDURE — 2023F PR DILATED RETINAL EXAM W/O EVID OF RETINOPATHY: ICD-10-PCS | Mod: CPTII,S$GLB,, | Performed by: OPHTHALMOLOGY

## 2022-08-01 PROCEDURE — 1160F RVW MEDS BY RX/DR IN RCRD: CPT | Mod: CPTII,S$GLB,, | Performed by: OPHTHALMOLOGY

## 2022-08-01 PROCEDURE — 1101F PT FALLS ASSESS-DOCD LE1/YR: CPT | Mod: CPTII,S$GLB,, | Performed by: OPHTHALMOLOGY

## 2022-08-01 PROCEDURE — 1126F PR PAIN SEVERITY QUANTIFIED, NO PAIN PRESENT: ICD-10-PCS | Mod: CPTII,S$GLB,, | Performed by: OPHTHALMOLOGY

## 2022-08-01 PROCEDURE — 92083 EXTENDED VISUAL FIELD XM: CPT | Mod: S$GLB,,, | Performed by: OPHTHALMOLOGY

## 2022-08-01 RX ORDER — NETARSUDIL AND LATANOPROST OPHTHALMIC SOLUTION, 0.02%/0.005% .2; .05 MG/ML; MG/ML
1 SOLUTION/ DROPS OPHTHALMIC; TOPICAL NIGHTLY
Qty: 5 ML | Refills: 0
Start: 2022-08-01 | End: 2022-09-12 | Stop reason: SDUPTHER

## 2022-08-01 NOTE — PROGRESS NOTES
HPI     Glaucoma     Comments: HVF and OCT review today and pt states no changes since last   exam               Comments     DLS:6/25/21    1. Severe LTG OU  2. VF Defects  OU  3. Pterygium OS  4. PCO OD  5. PCIOL OU  6. Type 2 DM no   7. Yag Cap OS    MEDS:  Brimonidine TID OU = PT STATES HE IS NOT USING X 1 YEAR - not sure why not   just not using it   Latanoprost QHS OU - reports good compliance           Last edited by Lona Grace MD on 8/1/2022  4:35 PM. (History)            Assessment /Plan     For exam results, see Encounter Report.    Low-tension glaucoma of both eyes, severe stage  -     Suarez Visual Field - OU - Extended - Both Eyes  -     Posterior Segment OCT Optic Nerve- Both eyes    Bilateral posterior capsular opacification    Pterygium of both eyes - Both Eyes    Hypertension associated with diabetes    Scotoma, central, bilateral    PCO (posterior capsular opacification), bilateral    Pseudophakia - Both Eyes    Pterygium eye, right          LOST TO F/U 1/2014 TO 1/2016 - 2 YEARS     Low Tension Glaucoma - severe stage ou  Begun on gtts 1999  First [id number]  First photos 1999           Family history    neg        Glaucoma meds    Latanoprost ou - ( use to use trusopt ou bid and alphagan)        H/O adverse rxn to glaucoma drops    Intol to timolol - heart palpitations        LASERS    ALT OD 9/15/2003 // yag cap os 3/22/2018         GLAUCOMA SURGERIES    none        OTHER EYE SURGERIES    PC IOL od 1/28/2009 // os 11/6/2009        CDR    0.8 w/ inf pit / 0.7 w/ inf notch (h/o sup disc heme os 8/22/2008)        Tbase    16-20 ou          Tmax    20/20            Ttarget    14/14             HVF    18 test 2000 to 2022 OD: sup alt defect, split fixation; OS: SAD with split fixation (? prog os )         Gonio    +3 ou        CCT    553/555        OCT   7 tests  2006 to 2022 - OD:hannah Pedersen G/T   // OS:dec G/T/TI        HRT    7 test 2004 to 2020 - MR -  Dec. S/I od // dec. hannah COVARRUBIAS  os /// CDR 0.75 od // 0.73 os        Disc photos    1999, 1005 - slides // 2008, 210, 2012, 2016, 2019   - OIS    - Ttoday  13-14//15-16  (up from 12/14 - hasnot been using the brimonidine  ) - target is 14 ou   - Test done today   iop hvf dfe // OCT     2.  VF defects arcuate w/ split fixation ou - 2/2 #1    3.  PCO ou - Vis sign os    + decrease in VA with BAT ou -    S/P yag cap os 3/22/2018    No need for yag cap od yet - cont to monitor- central vis axis still clear      4.  PC IOL OU       OD 1/28/2009        OS 11/6/2008 - s/p yag cap 3/22/2018 - good response    5. Pterygium OS - causing  a FB sensation - AT's prn    Also small one od - ?? Previous excision     6. DM- no DR on exam    7. H/O malaria - post [place] - decades ago at age 23   Recovered - used hydroxychloroquine a long time ago     PLAN   target - 14/14  ou   Above target os   Change latanoprost to rocklatan ou - 2 samples given - F/U 4-6 weeks - IOP check and to see if tolerating (8/1/2022)     Once again is NOT using brimonidine - can try adding back prn   If needed can add dorzolamide    Also is a candidate for slt - H/O ALT OD 2003 - ?? Any effect   Pt is also a candidate for durysta     + l PCO OD   - can have yag cap prn     cont AT's  PRN    F/U -4-6 weeks - IOP check on rocklatan (( target IOP 14 or less ou)     -  if IOP above target consider SLT (angles open)  vs ddurysta   Vs  Brimonidine vs -   trail of dorzolamide     I have seen and personally examined the patient.  I agree with the findings, assessment and plan of the resident and/or fellow.     Lona Grace MD

## 2022-09-11 NOTE — PROGRESS NOTES
HPI     Glaucoma            Comments: 6 week IOP ck and pt states that vision OD is occasionally   cloudy          Comments    DLS: 8/1/22    1. Severe LTG OU  2. VF Defects  OU  3. Pterygium OS  4. PCO OD  5. PCIOL OU  6. Type 2 DM no   7. Yag Cap OS    MEDS:  Rocklatan QHS OU          Last edited by Chantel Weinstein MA on 9/12/2022  2:32 PM.              Assessment /Plan     For exam results, see Encounter Report.    Low-tension glaucoma of both eyes, severe stage    PCO (posterior capsular opacification), bilateral    Pterygium of both eyes - Both Eyes    Hypertension associated with diabetes    Scotoma, central, bilateral    Diabetes mellitus type 2 without retinopathy    Pseudophakia - Both Eyes        LOST TO F/U 1/2014 TO 1/2016 - 2 YEARS     Low Tension Glaucoma - severe stage ou  Begun on gtts 1999  First [id number]  First photos 1999           Family history    neg        Glaucoma meds    Latanoprost ou - ( use to use trusopt ou bid and alphagan)        H/O adverse rxn to glaucoma drops    Intol to timolol - heart palpitations        LASERS    ALT OD 9/15/2003 // yag cap os 3/22/2018         GLAUCOMA SURGERIES    none        OTHER EYE SURGERIES    PC IOL od 1/28/2009 // os 11/6/2009        CDR    0.8 w/ inf pit / 0.7 w/ inf notch (h/o sup disc heme os 8/22/2008)        Tbase    16-20 ou          Tmax    20/20            Ttarget    14/14             HVF    18 test 2000 to 2022 OD: sup alt defect, split fixation; OS: SAD with split fixation (? prog os )         Gonio    +3 ou        CCT    553/555        OCT   7 tests  2006 to 2022 - OD:hannah Pedersen G/T   // OS:dec G/T/TI        HRT    7 test 2004 to 2020 - MR -  Dec. S/I od // dec. Ihannah os /// CDR 0.75 od // 0.73 os        Disc photos    1999, 1005 - slides // 2008, 210, 2012, 2016, 2019   - OIS    - Ttoday 10/17 (- target is 14 ou (long H/O poor compliance with drops )   - Test done today   iop on rocklatan    2.  VF defects arcuate w/ split fixation ou -  2/2 #1    3.  PCO ou - Vis sign os    + decrease in VA with BAT ou -    S/P yag cap os 3/22/2018    No need for yag cap od yet - cont to monitor- central vis axis still clear      4.  PC IOL OU       OD 1/28/2009        OS 11/6/2008 - s/p yag cap 3/22/2018 - good response    5. Pterygium OS - causing  a FB sensation - AT's prn    Also small one od - ?? Previous excision     6. DM- no DR on exam    7. H/O malaria - [place] - decades ago at age 23   Recovered - used hydroxychloroquine a long time ago     PLAN   target - 14/14  ou   Above target os   Change latanoprost to rocklatan ou - 2 samples given - F/U 4-6 weeks - IOP check and to see if tolerating (8/1/2022)     Once again is NOT using brimonidine - can try adding back prn   If needed can add dorzolamide    Also is a candidate for slt - H/O ALT OD 2003 - ?? Any effect   Pt is also a candidate for durysta     + l PCO OD   - can have yag cap prn     cont AT's  PRN    F/U - IOP ok od // still too high os - on rocklatan    Rec slt os     -  also consider durysta   Vs  Brimonidine vs -   trail of dorzolamide     Rx for rocklatan sent - should be covered as has failed latanoprost     Also once IOP controlled - consider yag cap od     I have seen and personally examined the patient.  I agree with the findings, assessment and plan of the resident and/or fellow.     Lona Grace MD

## 2022-09-12 ENCOUNTER — OFFICE VISIT (OUTPATIENT)
Dept: OPHTHALMOLOGY | Facility: CLINIC | Age: 77
End: 2022-09-12
Payer: MEDICARE

## 2022-09-12 DIAGNOSIS — H40.1233 LOW-TENSION GLAUCOMA OF BOTH EYES, SEVERE STAGE: Primary | ICD-10-CM

## 2022-09-12 DIAGNOSIS — I15.2 HYPERTENSION ASSOCIATED WITH DIABETES: ICD-10-CM

## 2022-09-12 DIAGNOSIS — H26.493 PCO (POSTERIOR CAPSULAR OPACIFICATION), BILATERAL: ICD-10-CM

## 2022-09-12 DIAGNOSIS — H53.413 SCOTOMA, CENTRAL, BILATERAL: ICD-10-CM

## 2022-09-12 DIAGNOSIS — E11.9 DIABETES MELLITUS TYPE 2 WITHOUT RETINOPATHY: ICD-10-CM

## 2022-09-12 DIAGNOSIS — E11.59 HYPERTENSION ASSOCIATED WITH DIABETES: ICD-10-CM

## 2022-09-12 DIAGNOSIS — Z96.1 PSEUDOPHAKIA: ICD-10-CM

## 2022-09-12 DIAGNOSIS — H11.003 PTERYGIUM OF BOTH EYES: ICD-10-CM

## 2022-09-12 PROCEDURE — 1160F PR REVIEW ALL MEDS BY PRESCRIBER/CLIN PHARMACIST DOCUMENTED: ICD-10-PCS | Mod: CPTII,S$GLB,, | Performed by: OPHTHALMOLOGY

## 2022-09-12 PROCEDURE — 1101F PR PT FALLS ASSESS DOC 0-1 FALLS W/OUT INJ PAST YR: ICD-10-PCS | Mod: CPTII,S$GLB,, | Performed by: OPHTHALMOLOGY

## 2022-09-12 PROCEDURE — 92020 PR SPECIAL EYE EVAL,GONIOSCOPY: ICD-10-PCS | Mod: S$GLB,,, | Performed by: OPHTHALMOLOGY

## 2022-09-12 PROCEDURE — 3288F PR FALLS RISK ASSESSMENT DOCUMENTED: ICD-10-PCS | Mod: CPTII,S$GLB,, | Performed by: OPHTHALMOLOGY

## 2022-09-12 PROCEDURE — 1101F PT FALLS ASSESS-DOCD LE1/YR: CPT | Mod: CPTII,S$GLB,, | Performed by: OPHTHALMOLOGY

## 2022-09-12 PROCEDURE — 3288F FALL RISK ASSESSMENT DOCD: CPT | Mod: CPTII,S$GLB,, | Performed by: OPHTHALMOLOGY

## 2022-09-12 PROCEDURE — 1160F RVW MEDS BY RX/DR IN RCRD: CPT | Mod: CPTII,S$GLB,, | Performed by: OPHTHALMOLOGY

## 2022-09-12 PROCEDURE — 92020 GONIOSCOPY: CPT | Mod: S$GLB,,, | Performed by: OPHTHALMOLOGY

## 2022-09-12 PROCEDURE — 99999 PR PBB SHADOW E&M-EST. PATIENT-LVL II: CPT | Mod: PBBFAC,,, | Performed by: OPHTHALMOLOGY

## 2022-09-12 PROCEDURE — 99499 UNLISTED E&M SERVICE: CPT | Mod: S$GLB,,, | Performed by: OPHTHALMOLOGY

## 2022-09-12 PROCEDURE — 1126F PR PAIN SEVERITY QUANTIFIED, NO PAIN PRESENT: ICD-10-PCS | Mod: CPTII,S$GLB,, | Performed by: OPHTHALMOLOGY

## 2022-09-12 PROCEDURE — 1159F MED LIST DOCD IN RCRD: CPT | Mod: CPTII,S$GLB,, | Performed by: OPHTHALMOLOGY

## 2022-09-12 PROCEDURE — 1159F PR MEDICATION LIST DOCUMENTED IN MEDICAL RECORD: ICD-10-PCS | Mod: CPTII,S$GLB,, | Performed by: OPHTHALMOLOGY

## 2022-09-12 PROCEDURE — 99999 PR PBB SHADOW E&M-EST. PATIENT-LVL II: ICD-10-PCS | Mod: PBBFAC,,, | Performed by: OPHTHALMOLOGY

## 2022-09-12 PROCEDURE — 92012 INTRM OPH EXAM EST PATIENT: CPT | Mod: S$GLB,,, | Performed by: OPHTHALMOLOGY

## 2022-09-12 PROCEDURE — 1126F AMNT PAIN NOTED NONE PRSNT: CPT | Mod: CPTII,S$GLB,, | Performed by: OPHTHALMOLOGY

## 2022-09-12 PROCEDURE — 92012 PR EYE EXAM, EST PATIENT,INTERMED: ICD-10-PCS | Mod: S$GLB,,, | Performed by: OPHTHALMOLOGY

## 2022-09-12 RX ORDER — NETARSUDIL AND LATANOPROST OPHTHALMIC SOLUTION, 0.02%/0.005% .2; .05 MG/ML; MG/ML
1 SOLUTION/ DROPS OPHTHALMIC; TOPICAL NIGHTLY
Qty: 2.5 ML | Refills: 10 | Status: SHIPPED | OUTPATIENT
Start: 2022-09-12 | End: 2023-01-30

## 2022-10-26 NOTE — PROGRESS NOTES
HPI     Laser Treatment            Comments: Slt os          Comments    SLT OS TODAY    1. Severe LTG OU  2. VF Defects  OU  3. Pterygium OS  4. PCO OD  5. PCIOL OU  6. Type 2 DM no   7. Yag Cap OS    MEDS:  Rocklatan QHS OU          Last edited by Chantel Weinstein MA on 10/27/2022 10:04 AM.            Assessment /Plan     For exam results, see Encounter Report.    Low-tension glaucoma of both eyes, severe stage    PCO (posterior capsular opacification), bilateral    Pterygium of both eyes - Both Eyes    Scotoma, central, bilateral    Pseudophakia - Both Eyes      LOST TO F/U 1/2014 TO 1/2016 - 2 YEARS     Low Tension Glaucoma - severe stage ou  Begun on gtts 1999  First [id number]  First photos 1999           Family history    neg        Glaucoma meds    Latanoprost ou - ( use to use trusopt ou bid and alphagan)        H/O adverse rxn to glaucoma drops    Intol to timolol - heart palpitations        LASERS    ALT OD 9/15/2003 // yag cap os 3/22/2018         GLAUCOMA SURGERIES    none        OTHER EYE SURGERIES    PC IOL od 1/28/2009 // os 11/6/2009        CDR    0.8 w/ inf pit / 0.7 w/ inf notch (h/o sup disc heme os 8/22/2008)        Tbase    16-20 ou          Tmax    20/20            Ttarget    14/14             HVF    18 test 2000 to 2022 OD: sup alt defect, split fixation; OS: SAD with split fixation (? prog os )         Gonio    +3 ou        CCT    553/555        OCT   7 tests  2006 to 2022 - OD:hannah Pedersen G/T   // OS:dec G/T/TI        HRT    7 test 2004 to 2020 - MR -  Dec. S/I od // dec. hannah COVARRUBIAS os /// CDR 0.75 od // 0.73 os        Disc photos    1999, 1005 - slides // 2008, 210, 2012, 2016, 2019   - OIS    - Ttoday 10/11 (IOP MUCH better today) (- target is 14 ou (long H/O poor compliance with drops )   - Test done today   iop on rocklatan     2.  VF defects arcuate w/ split fixation ou - 2/2 #1    3.  PCO ou - Vis sign os    + decrease in VA with BAT ou -    S/P yag cap os 3/22/2018    No need for yag cap  od yet - cont to monitor- central vis axis still clear      4.  PC IOL OU       OD 1/28/2009        OS 11/6/2008 - s/p yag cap 3/22/2018 - good response    5. Pterygium OS - causing  a FB sensation - AT's prn    Also small one od - ?? Previous excision     6. DM- no DR on exam    7. H/O malaria - post [place] - decades ago at age 23   Recovered - used hydroxychloroquine a long time ago     PLAN   target - 14/14  ou   Below target   Hold off on SLT as IOP better - 10/27/2022    Once again is NOT using brimonidine - can try adding back prn   If needed can add dorzolamide    Also is a candidate for slt - H/O ALT OD 2003 - ?? Any effect   Pt is also a candidate for durysta     + l PCO OD   - can have yag cap prn     cont AT's  PRN    Pt is a candidate for slt and also candidate for durysta prn     -  also consider durysta   Vs  Brimonidine vs -   trail of dorzolamide     Rx for rocklatan sent - should be covered as has failed latanoprost     Also once IOP controlled - consider yag cap od     F/U 2-3 months to check and see if IOP still improved       I have seen and personally examined the patient.  I agree with the findings, assessment and plan of the resident and/or fellow.     Lona Grace MD

## 2022-10-27 ENCOUNTER — OFFICE VISIT (OUTPATIENT)
Dept: OPHTHALMOLOGY | Facility: CLINIC | Age: 77
End: 2022-10-27
Payer: MEDICARE

## 2022-10-27 DIAGNOSIS — H11.003 PTERYGIUM OF BOTH EYES: ICD-10-CM

## 2022-10-27 DIAGNOSIS — H40.1233 LOW-TENSION GLAUCOMA OF BOTH EYES, SEVERE STAGE: Primary | ICD-10-CM

## 2022-10-27 DIAGNOSIS — Z96.1 PSEUDOPHAKIA: ICD-10-CM

## 2022-10-27 DIAGNOSIS — H26.493 PCO (POSTERIOR CAPSULAR OPACIFICATION), BILATERAL: ICD-10-CM

## 2022-10-27 DIAGNOSIS — H53.413 SCOTOMA, CENTRAL, BILATERAL: ICD-10-CM

## 2022-10-27 PROCEDURE — 99999 PR PBB SHADOW E&M-EST. PATIENT-LVL III: ICD-10-PCS | Mod: PBBFAC,,, | Performed by: OPHTHALMOLOGY

## 2022-10-27 PROCEDURE — 1159F MED LIST DOCD IN RCRD: CPT | Mod: CPTII,S$GLB,, | Performed by: OPHTHALMOLOGY

## 2022-10-27 PROCEDURE — 92012 PR EYE EXAM, EST PATIENT,INTERMED: ICD-10-PCS | Mod: S$GLB,,, | Performed by: OPHTHALMOLOGY

## 2022-10-27 PROCEDURE — 1160F PR REVIEW ALL MEDS BY PRESCRIBER/CLIN PHARMACIST DOCUMENTED: ICD-10-PCS | Mod: CPTII,S$GLB,, | Performed by: OPHTHALMOLOGY

## 2022-10-27 PROCEDURE — 1126F PR PAIN SEVERITY QUANTIFIED, NO PAIN PRESENT: ICD-10-PCS | Mod: CPTII,S$GLB,, | Performed by: OPHTHALMOLOGY

## 2022-10-27 PROCEDURE — 1159F PR MEDICATION LIST DOCUMENTED IN MEDICAL RECORD: ICD-10-PCS | Mod: CPTII,S$GLB,, | Performed by: OPHTHALMOLOGY

## 2022-10-27 PROCEDURE — 92012 INTRM OPH EXAM EST PATIENT: CPT | Mod: S$GLB,,, | Performed by: OPHTHALMOLOGY

## 2022-10-27 PROCEDURE — 3288F FALL RISK ASSESSMENT DOCD: CPT | Mod: CPTII,S$GLB,, | Performed by: OPHTHALMOLOGY

## 2022-10-27 PROCEDURE — 1126F AMNT PAIN NOTED NONE PRSNT: CPT | Mod: CPTII,S$GLB,, | Performed by: OPHTHALMOLOGY

## 2022-10-27 PROCEDURE — 1101F PT FALLS ASSESS-DOCD LE1/YR: CPT | Mod: CPTII,S$GLB,, | Performed by: OPHTHALMOLOGY

## 2022-10-27 PROCEDURE — 3288F PR FALLS RISK ASSESSMENT DOCUMENTED: ICD-10-PCS | Mod: CPTII,S$GLB,, | Performed by: OPHTHALMOLOGY

## 2022-10-27 PROCEDURE — 99499 UNLISTED E&M SERVICE: CPT | Mod: S$GLB,,, | Performed by: OPHTHALMOLOGY

## 2022-10-27 PROCEDURE — 1160F RVW MEDS BY RX/DR IN RCRD: CPT | Mod: CPTII,S$GLB,, | Performed by: OPHTHALMOLOGY

## 2022-10-27 PROCEDURE — 1101F PR PT FALLS ASSESS DOC 0-1 FALLS W/OUT INJ PAST YR: ICD-10-PCS | Mod: CPTII,S$GLB,, | Performed by: OPHTHALMOLOGY

## 2022-10-27 PROCEDURE — 99999 PR PBB SHADOW E&M-EST. PATIENT-LVL III: CPT | Mod: PBBFAC,,, | Performed by: OPHTHALMOLOGY

## 2023-01-24 ENCOUNTER — PATIENT MESSAGE (OUTPATIENT)
Dept: ADMINISTRATIVE | Facility: HOSPITAL | Age: 78
End: 2023-01-24
Payer: MEDICARE

## 2023-01-29 NOTE — PROGRESS NOTES
HPI     Glaucoma     Additional comments: 3 month ck and pt states that drops make eyes feel   irritated OD>OS           Comments    DLS: 10/2722    1. Severe LTG OU  2. VF Defects  OU  3. Pterygium OS  4. PCO OD  5. PCIOL OU  6. Type 2 DM no   7. Yag Cap OS    MEDS:  Rocklatan QHS OU = PT USING EVERY OTHER DAY NOT QHS          Last edited by Chantel Weinstein MA on 1/30/2023 10:52 AM.            Assessment /Plan     For exam results, see Encounter Report.    Low-tension glaucoma of both eyes, severe stage    PCO (posterior capsular opacification), bilateral    Pterygium of both eyes - Both Eyes    Scotoma, central, bilateral    Pseudophakia - Both Eyes      LOST TO F/U 1/2014 TO 1/2016 - 2 YEARS     Low Tension Glaucoma - severe stage ou  Begun on gtts 1999  First [id number]  First photos 1999           Family history    neg        Glaucoma meds    Latanoprost ou - ( use to use trusopt ou bid and alphagan)        H/O adverse rxn to glaucoma drops    Intol to timolol - heart palpitations        LASERS    ALT OD 9/15/2003 // yag cap os 3/22/2018         GLAUCOMA SURGERIES    none        OTHER EYE SURGERIES    PC IOL od 1/28/2009 // os 11/6/2009        CDR    0.8 w/ inf pit / 0.7 w/ inf notch (h/o sup disc heme os 8/22/2008)        Tbase    16-20 ou          Tmax    20/20            Ttarget    14/14             HVF    18 test 2000 to 2022 OD: sup alt defect, split fixation; OS: SAD with split fixation (? prog os )         Gonio    +3 ou        CCT    553/555        OCT   7 tests  2006 to 2022 - OD:hannah Pedersen G/T   // OS:dec G/T/TI        HRT    7 test 2004 to 2020 - MR -  Dec. S/I od // dec. Ihannah os /// CDR 0.75 od // 0.73 os        Disc photos    1999, 1005 - slides // 2008, 210, 2012, 2016, 2019   - OIS    - Ttoday 10/11 (IOP MUCH better today) (- target is 14 ou (long H/O poor compliance with drops )   - Test done today   iop on rocklatan     2.  VF defects arcuate w/ split fixation ou - 2/2 #1    3.  PCO ou -  Vis sign os    + decrease in VA with BAT ou -    S/P yag cap os 3/22/2018    No need for yag cap od yet - cont to monitor- central vis axis still clear      4.  PC IOL OU       OD 2009        OS 2008 - s/p yag cap 3/22/2018 - good response    5. Pterygium OS - causing  a FB sensation - AT's prn    Also small one od - ?? Previous excision     6. DM- no DR on exam    7. H/O malaria - post [place] - decades ago at age 23   Recovered - used hydroxychloroquine a long time ago     PLAN   target -   ou   Below target od but above target os ( )   Not tolerating rocklatan well - has  to every other day   Switch back to latanoprost  ou     Schedule SLT OS as  IOP is up again os     NOT presently using brimonidine - can try adding back prn   If needed can try adding  dorzolamide    Also is a candidate for slt - H/O ALT OD  - ?? Any effect   Pt is also a candidate for durysta     + PCO OD   - can have yag cap prn     cont AT's  PRN    Pt is a candidate for slt and also candidate for durysta prn     -  also consider durysta   Vs  Brimonidine vs -   trail of dorzolamide     Rx for rocklatan sent - should be covered as has failed latanoprost - NOT tolerating rocklatan - swithc back to latanoprost ou and schedule SLT os     Also once IOP controlled - consider yag cap od     I have seen and personally examined the patient.  I agree with the findings, assessment and plan of the resident and/or fellow.     Lona Grace MD

## 2023-01-30 ENCOUNTER — OFFICE VISIT (OUTPATIENT)
Dept: OPHTHALMOLOGY | Facility: CLINIC | Age: 78
End: 2023-01-30
Payer: MEDICARE

## 2023-01-30 DIAGNOSIS — H40.1233 LOW-TENSION GLAUCOMA OF BOTH EYES, SEVERE STAGE: Primary | ICD-10-CM

## 2023-01-30 DIAGNOSIS — H26.493 PCO (POSTERIOR CAPSULAR OPACIFICATION), BILATERAL: ICD-10-CM

## 2023-01-30 DIAGNOSIS — H11.003 PTERYGIUM OF BOTH EYES: ICD-10-CM

## 2023-01-30 DIAGNOSIS — H53.413 SCOTOMA, CENTRAL, BILATERAL: ICD-10-CM

## 2023-01-30 DIAGNOSIS — Z96.1 PSEUDOPHAKIA: ICD-10-CM

## 2023-01-30 PROCEDURE — 92012 INTRM OPH EXAM EST PATIENT: CPT | Mod: HCNC,S$GLB,, | Performed by: OPHTHALMOLOGY

## 2023-01-30 PROCEDURE — 1126F AMNT PAIN NOTED NONE PRSNT: CPT | Mod: HCNC,CPTII,S$GLB, | Performed by: OPHTHALMOLOGY

## 2023-01-30 PROCEDURE — 1159F PR MEDICATION LIST DOCUMENTED IN MEDICAL RECORD: ICD-10-PCS | Mod: HCNC,CPTII,S$GLB, | Performed by: OPHTHALMOLOGY

## 2023-01-30 PROCEDURE — 1101F PT FALLS ASSESS-DOCD LE1/YR: CPT | Mod: HCNC,CPTII,S$GLB, | Performed by: OPHTHALMOLOGY

## 2023-01-30 PROCEDURE — 1160F RVW MEDS BY RX/DR IN RCRD: CPT | Mod: HCNC,CPTII,S$GLB, | Performed by: OPHTHALMOLOGY

## 2023-01-30 PROCEDURE — 3288F FALL RISK ASSESSMENT DOCD: CPT | Mod: HCNC,CPTII,S$GLB, | Performed by: OPHTHALMOLOGY

## 2023-01-30 PROCEDURE — 3288F PR FALLS RISK ASSESSMENT DOCUMENTED: ICD-10-PCS | Mod: HCNC,CPTII,S$GLB, | Performed by: OPHTHALMOLOGY

## 2023-01-30 PROCEDURE — 1101F PR PT FALLS ASSESS DOC 0-1 FALLS W/OUT INJ PAST YR: ICD-10-PCS | Mod: HCNC,CPTII,S$GLB, | Performed by: OPHTHALMOLOGY

## 2023-01-30 PROCEDURE — 1160F PR REVIEW ALL MEDS BY PRESCRIBER/CLIN PHARMACIST DOCUMENTED: ICD-10-PCS | Mod: HCNC,CPTII,S$GLB, | Performed by: OPHTHALMOLOGY

## 2023-01-30 PROCEDURE — 1126F PR PAIN SEVERITY QUANTIFIED, NO PAIN PRESENT: ICD-10-PCS | Mod: HCNC,CPTII,S$GLB, | Performed by: OPHTHALMOLOGY

## 2023-01-30 PROCEDURE — 92012 PR EYE EXAM, EST PATIENT,INTERMED: ICD-10-PCS | Mod: HCNC,S$GLB,, | Performed by: OPHTHALMOLOGY

## 2023-01-30 PROCEDURE — 1159F MED LIST DOCD IN RCRD: CPT | Mod: HCNC,CPTII,S$GLB, | Performed by: OPHTHALMOLOGY

## 2023-01-30 PROCEDURE — 92020 GONIOSCOPY: CPT | Mod: HCNC,S$GLB,, | Performed by: OPHTHALMOLOGY

## 2023-01-30 PROCEDURE — 99999 PR PBB SHADOW E&M-EST. PATIENT-LVL III: ICD-10-PCS | Mod: PBBFAC,HCNC,, | Performed by: OPHTHALMOLOGY

## 2023-01-30 PROCEDURE — 99999 PR PBB SHADOW E&M-EST. PATIENT-LVL III: CPT | Mod: PBBFAC,HCNC,, | Performed by: OPHTHALMOLOGY

## 2023-01-30 PROCEDURE — 92020 PR SPECIAL EYE EVAL,GONIOSCOPY: ICD-10-PCS | Mod: HCNC,S$GLB,, | Performed by: OPHTHALMOLOGY

## 2023-01-30 RX ORDER — LATANOPROST 50 UG/ML
1 SOLUTION/ DROPS OPHTHALMIC NIGHTLY
Qty: 2.5 ML | Refills: 12 | Status: SHIPPED | OUTPATIENT
Start: 2023-01-30 | End: 2023-07-06 | Stop reason: SDUPTHER

## 2023-02-07 DIAGNOSIS — Z00.00 ENCOUNTER FOR MEDICARE ANNUAL WELLNESS EXAM: ICD-10-CM

## 2023-02-09 DIAGNOSIS — Z00.00 ENCOUNTER FOR MEDICARE ANNUAL WELLNESS EXAM: ICD-10-CM

## 2023-05-05 ENCOUNTER — PES CALL (OUTPATIENT)
Dept: ADMINISTRATIVE | Facility: CLINIC | Age: 78
End: 2023-05-05
Payer: MEDICARE

## 2023-05-26 ENCOUNTER — TELEPHONE (OUTPATIENT)
Dept: OPHTHALMOLOGY | Facility: CLINIC | Age: 78
End: 2023-05-26
Payer: MEDICARE

## 2023-05-26 NOTE — TELEPHONE ENCOUNTER
Left voice message for pt to call back to reschedule his appt with Dr. Grace due to Dr. Grace being out doing emergency surgery.

## 2023-06-22 NOTE — PROGRESS NOTES
HPI    DLS: 01/30/2023 Dr. Grace    1. Severe LTG OU   2. VF Defects  OU   3. Pterygium OS   4. PCO OD   5. PCIOL OU   6. Type 2 DM no DR   7. Yag Cap OS     MEDS:   Rocklatan QHS OU = PT USING EVERY OTHER DAY NOT QHS- NOT TOLERATING WELL    - EYELID IRRITATION     77 y.o. male is here for IOP check, c/x SLT OS. Pt states that after   putting Rocklatan drops in feel irritation.Itching medial canthus,   bilateral. Blurred vision at distance and near w/o correction.         Last edited by Lona Grace MD on 7/6/2023 11:16 AM.            Assessment /Plan     For exam results, see Encounter Report.    Low-tension glaucoma of both eyes, severe stage    PCO (posterior capsular opacification), bilateral    Pterygium of both eyes - Both Eyes    Scotoma, central, bilateral    Diabetes mellitus type 2 without retinopathy    Pterygium eye, right    Hypertension associated with diabetes    Pseudophakia - Both Eyes        LOST TO F/U 1/2014 TO 1/2016 - 2 YEARS     Low Tension Glaucoma - severe stage ou  Begun on gtts 1999  First [id number]  First photos 1999           Family history    neg        Glaucoma meds    Latanoprost ou - ( use to use trusopt ou bid and alphagan)        H/O adverse rxn to glaucoma drops    Intol to timolol - heart palpitations // intol rocklatan - eyelid irritation         LASERS    ALT OD 9/15/2003 // yag cap os 3/22/2018         GLAUCOMA SURGERIES    none        OTHER EYE SURGERIES    PC IOL od 1/28/2009 // os 11/6/2009        CDR    0.8 w/ inf pit / 0.7 w/ inf notch (h/o sup disc heme os 8/22/2008)        Tbase    16-20 ou          Tmax    20/20            Ttarget    14/14             HVF    18 test 2000 to 2022 OD: sup alt defect, split fixation; OS: SAD with split fixation (? prog os )         Gonio    +3 ou        CCT    553/555        OCT   7 tests  2006 to 2022 - OD:hannah Pedersen G/T   // OS:dec G/T/TI        HRT    7 test 2004 to 2020 - MR -  Dec. S/I od // dec. hannah COVARRUBIAS os /// CDR  0.75 od // 0.73 os        Disc photos    1999, 1005 - slides / , 210, , , 2019   - OIS    - Ttoday   (IOP MUCH better today) (- target is 14 ou (long H/O poor compliance with drops )   - Test done today  WAS TO HAVE SLT OS - but pt Cx for now     2.  VF defects arcuate w/ split fixation ou -  #1    3.  PCO ou - Vis sign os    + decrease in VA with BAT ou -    S/P yag cap os 3/22/2018    No need for yag cap od yet - cont to monitor- central vis axis still clear      4.  PC IOL OU       OD 2009        OS 2008 - s/p yag cap 3/22/2018 - good response    5. Pterygium OS - causing  a FB sensation - AT's prn    Also small one od - ?? Previous excision     6. DM- no DR on exam    7. H/O malaria - post [place] - decades ago at age 23   Recovered - used hydroxychloroquine a long time ago     PLAN   target -   ou   Just at target od but above target os ( )   Not tolerating rocklatan well - has  to every other day - STOP   Switch back to latanoprost  ou     Schedule SLT OS as  IOP is up again os     NOT presently using brimonidine - can try adding back prn   If needed can try adding  dorzolamide    Also is a candidate for slt - H/O ALT OD  - ?? Any effect   Pt is also may be a candidate for durysta     + PCO OD   - can have yag cap prn     cont AT's  PRN    Pt is a candidate for slt and also candidate for durysta prn     -  also consider durysta   Vs  Brimonidine vs -   trail of dorzolamide     Also once IOP controlled - consider yag cap od     F/U for SLT os (may or may not need one OD)     I have seen and personally examined the patient.  I agree with the findings, assessment and plan of the resident and/or fellow.     Lona Grace MD

## 2023-07-06 ENCOUNTER — OFFICE VISIT (OUTPATIENT)
Dept: OPHTHALMOLOGY | Facility: CLINIC | Age: 78
End: 2023-07-06
Payer: MEDICARE

## 2023-07-06 DIAGNOSIS — E11.9 DIABETES MELLITUS TYPE 2 WITHOUT RETINOPATHY: ICD-10-CM

## 2023-07-06 DIAGNOSIS — I15.2 HYPERTENSION ASSOCIATED WITH DIABETES: ICD-10-CM

## 2023-07-06 DIAGNOSIS — H11.001 PTERYGIUM EYE, RIGHT: ICD-10-CM

## 2023-07-06 DIAGNOSIS — H40.1233 LOW-TENSION GLAUCOMA OF BOTH EYES, SEVERE STAGE: Primary | ICD-10-CM

## 2023-07-06 DIAGNOSIS — H53.413 SCOTOMA, CENTRAL, BILATERAL: ICD-10-CM

## 2023-07-06 DIAGNOSIS — Z96.1 PSEUDOPHAKIA: ICD-10-CM

## 2023-07-06 DIAGNOSIS — H26.493 PCO (POSTERIOR CAPSULAR OPACIFICATION), BILATERAL: ICD-10-CM

## 2023-07-06 DIAGNOSIS — E11.59 HYPERTENSION ASSOCIATED WITH DIABETES: ICD-10-CM

## 2023-07-06 DIAGNOSIS — H11.003 PTERYGIUM OF BOTH EYES: ICD-10-CM

## 2023-07-06 PROCEDURE — 99999 PR PBB SHADOW E&M-EST. PATIENT-LVL III: ICD-10-PCS | Mod: PBBFAC,HCNC,, | Performed by: OPHTHALMOLOGY

## 2023-07-06 PROCEDURE — 1126F PR PAIN SEVERITY QUANTIFIED, NO PAIN PRESENT: ICD-10-PCS | Mod: HCNC,CPTII,S$GLB, | Performed by: OPHTHALMOLOGY

## 2023-07-06 PROCEDURE — 3288F PR FALLS RISK ASSESSMENT DOCUMENTED: ICD-10-PCS | Mod: HCNC,CPTII,S$GLB, | Performed by: OPHTHALMOLOGY

## 2023-07-06 PROCEDURE — 1160F RVW MEDS BY RX/DR IN RCRD: CPT | Mod: HCNC,CPTII,S$GLB, | Performed by: OPHTHALMOLOGY

## 2023-07-06 PROCEDURE — 1160F PR REVIEW ALL MEDS BY PRESCRIBER/CLIN PHARMACIST DOCUMENTED: ICD-10-PCS | Mod: HCNC,CPTII,S$GLB, | Performed by: OPHTHALMOLOGY

## 2023-07-06 PROCEDURE — 3288F FALL RISK ASSESSMENT DOCD: CPT | Mod: HCNC,CPTII,S$GLB, | Performed by: OPHTHALMOLOGY

## 2023-07-06 PROCEDURE — 1101F PR PT FALLS ASSESS DOC 0-1 FALLS W/OUT INJ PAST YR: ICD-10-PCS | Mod: HCNC,CPTII,S$GLB, | Performed by: OPHTHALMOLOGY

## 2023-07-06 PROCEDURE — 99214 PR OFFICE/OUTPT VISIT, EST, LEVL IV, 30-39 MIN: ICD-10-PCS | Mod: HCNC,S$GLB,, | Performed by: OPHTHALMOLOGY

## 2023-07-06 PROCEDURE — 1126F AMNT PAIN NOTED NONE PRSNT: CPT | Mod: HCNC,CPTII,S$GLB, | Performed by: OPHTHALMOLOGY

## 2023-07-06 PROCEDURE — 99999 PR PBB SHADOW E&M-EST. PATIENT-LVL III: CPT | Mod: PBBFAC,HCNC,, | Performed by: OPHTHALMOLOGY

## 2023-07-06 PROCEDURE — 1101F PT FALLS ASSESS-DOCD LE1/YR: CPT | Mod: HCNC,CPTII,S$GLB, | Performed by: OPHTHALMOLOGY

## 2023-07-06 PROCEDURE — 99214 OFFICE O/P EST MOD 30 MIN: CPT | Mod: HCNC,S$GLB,, | Performed by: OPHTHALMOLOGY

## 2023-07-06 PROCEDURE — 1159F MED LIST DOCD IN RCRD: CPT | Mod: HCNC,CPTII,S$GLB, | Performed by: OPHTHALMOLOGY

## 2023-07-06 PROCEDURE — 1159F PR MEDICATION LIST DOCUMENTED IN MEDICAL RECORD: ICD-10-PCS | Mod: HCNC,CPTII,S$GLB, | Performed by: OPHTHALMOLOGY

## 2023-07-06 RX ORDER — NETARSUDIL AND LATANOPROST OPHTHALMIC SOLUTION, 0.02%/0.005% .2; .05 MG/ML; MG/ML
SOLUTION/ DROPS OPHTHALMIC; TOPICAL EVERY OTHER DAY
COMMUNITY
Start: 2023-04-07 | End: 2023-07-06

## 2023-07-06 RX ORDER — LATANOPROST 50 UG/ML
1 SOLUTION/ DROPS OPHTHALMIC NIGHTLY
Qty: 7.5 ML | Refills: 3 | Status: SHIPPED | OUTPATIENT
Start: 2023-07-06

## 2023-08-12 NOTE — PROGRESS NOTES
HPI    DLS: 7/06/2023    Pt here for SLT OS;  Pt states no eye pain or discomfort. Pt states eyes are much better when   he started using the Latanoprost eye drops but he did forget to put the   eye drop in last night.     Meds;  Latanoprost QHS OU    1. Severe LTG OU   2. VF Defects  OU   3. Pterygium OS   4. PCO OD   5. PCIOL OU   6. Type 2 DM no    7. Yag Cap OS        Last edited by Donna Matson on 8/17/2023  9:27 AM.            Assessment /Plan     For exam results, see Encounter Report.    Low-tension glaucoma of both eyes, severe stage    PCO (posterior capsular opacification), bilateral    Pterygium of both eyes - Both Eyes    Scotoma, central, bilateral    Diabetes mellitus type 2 without retinopathy    Pterygium eye, right    Hypertension associated with diabetes    Pseudophakia - Both Eyes        LOST TO F/U 1/2014 TO 1/2016 - 2 YEARS     Low Tension Glaucoma - severe stage ou  Begun on gtts 1999  First [id number]  First photos 1999           Family history    neg        Glaucoma meds    Latanoprost ou - ( use to use trusopt ou bid and alphagan)        H/O adverse rxn to glaucoma drops    Intol to timolol - heart palpitations // intol rocklatan - eyelid irritation         LASERS    ALT OD 9/15/2003 // yag cap os 3/22/2018         GLAUCOMA SURGERIES    none        OTHER EYE SURGERIES    PC IOL od 1/28/2009 // os 11/6/2009        CDR    0.8 w/ inf pit / 0.7 w/ inf notch (h/o sup disc heme os 8/22/2008)        Tbase    16-20 ou          Tmax    20/20            Ttarget    14/14             HVF    18 test 2000 to 2022 OD: sup alt defect, split fixation; OS: SAD with split fixation (? prog os )         Gonio    +3 ou        CCT    553/555        OCT   7 tests  2006 to 2022 - OD:hannah Pedersen G/T   // OS:dec G/T/TI        HRT    7 test 2004 to 2020 - MR -  Dec. S/I od // dec. Ihannah os /// CDR 0.75 od // 0.73 os        Disc photos    1999, 1005 - slides // 2008, 210, 2012, 2016, 2019   - OIS    - [place]    (IOP MUCH better today) (- target is 14 ou (long H/O poor compliance with drops )   - Test done today   SLT OS -    2.  VF defects arcuate w/ split fixation ou - 2/2 #1    3.  PCO ou - Vis sign os    + decrease in VA with BAT ou -    S/P yag cap os 3/22/2018    No need for yag cap od yet - cont to monitor- central vis axis still clear      4.  PC IOL OU       OD 2009        OS 2008 - s/p yag cap 3/22/2018 - good response    5. Pterygium OS - causing  a FB sensation - AT's prn    Also small one od - ?? Previous excision     6. DM- no DR on exam    7. H/O malaria - [place] - decades ago at age 23   Recovered - used hydroxychloroquine a long time ago     PLAN   target - 14/14  ou   Just at target od but above target os ( )   Not tolerating rocklatan well - has  to every other day - STOP   Switch back to latanoprost  ou     NOT presently using brimonidine - can try adding back prn   If needed can try adding  dorzolamide    Also is a candidate for slt - H/O ALT OD  - ?? Any effect   Pt is also may be a candidate for durysta     + PCO OD   - can have yag cap prn     cont AT's  PRN    Pt is a candidate for slt and also candidate for durysta prn     -  also consider durysta   Vs  Brimonidine vs -   trail of dorzolamide     Also once IOP controlled - consider yag cap od     SLT os - done 2023 - steroid taper over 12 days - written instructions given    (may or may not need one OD)     F/U 1-2 months - IOP check post SLT os     I have seen and personally examined the patient.  I agree with the findings, assessment and plan of the resident and/or fellow.     Lona Grace MD

## 2023-08-14 ENCOUNTER — PATIENT MESSAGE (OUTPATIENT)
Dept: OPHTHALMOLOGY | Facility: CLINIC | Age: 78
End: 2023-08-14
Payer: MEDICARE

## 2023-08-17 ENCOUNTER — OFFICE VISIT (OUTPATIENT)
Dept: OPHTHALMOLOGY | Facility: CLINIC | Age: 78
End: 2023-08-17
Payer: MEDICARE

## 2023-08-17 DIAGNOSIS — E11.9 DIABETES MELLITUS TYPE 2 WITHOUT RETINOPATHY: ICD-10-CM

## 2023-08-17 DIAGNOSIS — H53.413 SCOTOMA, CENTRAL, BILATERAL: ICD-10-CM

## 2023-08-17 DIAGNOSIS — I15.2 HYPERTENSION ASSOCIATED WITH DIABETES: ICD-10-CM

## 2023-08-17 DIAGNOSIS — H26.493 PCO (POSTERIOR CAPSULAR OPACIFICATION), BILATERAL: ICD-10-CM

## 2023-08-17 DIAGNOSIS — H11.003 PTERYGIUM OF BOTH EYES: ICD-10-CM

## 2023-08-17 DIAGNOSIS — H11.001 PTERYGIUM EYE, RIGHT: ICD-10-CM

## 2023-08-17 DIAGNOSIS — H40.1233 LOW-TENSION GLAUCOMA OF BOTH EYES, SEVERE STAGE: Primary | ICD-10-CM

## 2023-08-17 DIAGNOSIS — E11.59 HYPERTENSION ASSOCIATED WITH DIABETES: ICD-10-CM

## 2023-08-17 DIAGNOSIS — Z96.1 PSEUDOPHAKIA: ICD-10-CM

## 2023-08-17 PROCEDURE — 99499 UNLISTED E&M SERVICE: CPT | Mod: HCNC,S$GLB,, | Performed by: OPHTHALMOLOGY

## 2023-08-17 PROCEDURE — 99999 PR PBB SHADOW E&M-EST. PATIENT-LVL III: ICD-10-PCS | Mod: PBBFAC,HCNC,, | Performed by: OPHTHALMOLOGY

## 2023-08-17 PROCEDURE — 99999 PR PBB SHADOW E&M-EST. PATIENT-LVL III: CPT | Mod: PBBFAC,HCNC,, | Performed by: OPHTHALMOLOGY

## 2023-08-17 PROCEDURE — 65855 TRABECULOPLASTY LASER SURG: CPT | Mod: HCNC,LT,S$GLB, | Performed by: OPHTHALMOLOGY

## 2023-08-17 PROCEDURE — 99499 NO LOS: ICD-10-PCS | Mod: HCNC,S$GLB,, | Performed by: OPHTHALMOLOGY

## 2023-08-17 PROCEDURE — 65855 ARGON TRABECULOPLASTY: ICD-10-PCS | Mod: HCNC,LT,S$GLB, | Performed by: OPHTHALMOLOGY

## 2023-09-23 NOTE — PROGRESS NOTES
HPI    DLS: 8/17/2023    Pt here for IOP Check;  S/P SLT OS- 8/17/2023  Pt states no eye pain or discomfort. Pt states he needs refills on his   Latanoprost eye drops.    Meds;  Latanoprost QHS OU    1. Severe LTG  OU   2. VF Defects  OU   3. Pterygium OS   4. PCO OD   5. PCIOL OU   6. Type 2 DM no DR   7. Yag Cap OS           Last edited by Donna Matson on 9/25/2023  3:31 PM.            Assessment /Plan     For exam results, see Encounter Report.    Low-tension glaucoma of both eyes, severe stage    Scotoma, central, bilateral    PCO (posterior capsular opacification), bilateral    Pterygium of both eyes - Both Eyes    Diabetes mellitus type 2 without retinopathy    Pseudophakia - Both Eyes        F/U SLT OS ( 8/17/2023) (NONE OD NEEDED TO DATE)     LOST TO F/U 1/2014 TO 1/2016 - 2 YEARS     Low Tension Glaucoma - severe stage ou  Begun on gtts 1999  First [id number]  First photos 1999           Family history    neg        Glaucoma meds    Latanoprost ou - ( use to use trusopt ou bid and alphagan)        H/O adverse rxn to glaucoma drops    Intol to timolol - heart palpitations // intol rocklatan - eyelid irritation         LASERS    ALT OD 9/15/2003 // yag cap os 3/22/2018 // SLT os 8/17/2023 - good resp 19--> 16        GLAUCOMA SURGERIES    none        OTHER EYE SURGERIES    PC IOL od 1/28/2009 // os 11/6/2009        CDR    0.8 w/ inf pit / 0.7 w/ inf notch (h/o sup disc heme os 8/22/2008)        Tbase    16-20 ou          Tmax    20/20            Ttarget    14/14             HVF    18 test 2000 to 2022 OD: sup alt defect, split fixation; OS: SAD with split fixation (? prog os )         Gonio    +3 ou        CCT    553/555        OCT   7 tests  2006 to 2022 - OD:hannah Pedersen G/T   // OS:dec G/T/TI        HRT    7 test 2004 to 2020 - MR -  Dec. S/I od // dec. I, bord T os /// CDR 0.75 od // 0.73 os        Disc photos    1999, 1005 - slides // 2008, 210, 2012, 2016, 2019   - OIS    - Ttoday 14/16   (- target is 14  ou (long H/O poor compliance with drops )   - Test done today   SLT OS - good resp 19 os --> 16     2.  VF defects arcuate w/ split fixation ou - 2/2 #1    3.  PCO ou - Vis sign os    + decrease in VA with BAT ou -    S/P yag cap os 3/22/2018    Can have yag cap od prn - cont to monitor- central vis axis still clear      4.  PC IOL OU       OD 2009        OS 2008 - s/p yag cap 3/22/2018 - good response    5. Pterygium OS - causing  a FB sensation - AT's prn    Also small one od - ?? Previous excision     6. DM- no DR on exam    7. H/O malaria - post [place] - decades ago at age 23   Recovered - used hydroxychloroquine a long time ago     PLAN   target -   ou   Just at target od but above target os ( )   Not tolerating rocklatan well - has  to every other day - STOP   Switch back to latanoprost  ou       + PCO OD   - can have yag cap prn     cont AT's  PRN    Pt is a  candidate for durysta prn     -  also consider durysta   Vs  Brimonidine vs -   trail of dorzolamide     Also once IOP controlled - consider yag cap od     SLT os - done 2023 -  good restp 19--> 16 os   No need for slt od at this time    F/U 4 months with HVF / DFE / OCT     I have seen and personally examined the patient.  I agree with the findings, assessment and plan of the resident and/or fellow.     Lona Grace MD

## 2023-09-25 ENCOUNTER — OFFICE VISIT (OUTPATIENT)
Dept: OPHTHALMOLOGY | Facility: CLINIC | Age: 78
End: 2023-09-25
Payer: MEDICARE

## 2023-09-25 DIAGNOSIS — Z96.1 PSEUDOPHAKIA: ICD-10-CM

## 2023-09-25 DIAGNOSIS — E11.9 DIABETES MELLITUS TYPE 2 WITHOUT RETINOPATHY: ICD-10-CM

## 2023-09-25 DIAGNOSIS — H40.1233 LOW-TENSION GLAUCOMA OF BOTH EYES, SEVERE STAGE: Primary | ICD-10-CM

## 2023-09-25 DIAGNOSIS — H11.003 PTERYGIUM OF BOTH EYES: ICD-10-CM

## 2023-09-25 DIAGNOSIS — H53.413 SCOTOMA, CENTRAL, BILATERAL: ICD-10-CM

## 2023-09-25 DIAGNOSIS — H26.493 PCO (POSTERIOR CAPSULAR OPACIFICATION), BILATERAL: ICD-10-CM

## 2023-09-25 PROCEDURE — 99999 PR PBB SHADOW E&M-EST. PATIENT-LVL III: ICD-10-PCS | Mod: PBBFAC,HCNC,, | Performed by: OPHTHALMOLOGY

## 2023-09-25 PROCEDURE — 1126F PR PAIN SEVERITY QUANTIFIED, NO PAIN PRESENT: ICD-10-PCS | Mod: HCNC,CPTII,S$GLB, | Performed by: OPHTHALMOLOGY

## 2023-09-25 PROCEDURE — 99214 PR OFFICE/OUTPT VISIT, EST, LEVL IV, 30-39 MIN: ICD-10-PCS | Mod: HCNC,S$GLB,, | Performed by: OPHTHALMOLOGY

## 2023-09-25 PROCEDURE — 99999 PR PBB SHADOW E&M-EST. PATIENT-LVL III: CPT | Mod: PBBFAC,HCNC,, | Performed by: OPHTHALMOLOGY

## 2023-09-25 PROCEDURE — 1160F RVW MEDS BY RX/DR IN RCRD: CPT | Mod: HCNC,CPTII,S$GLB, | Performed by: OPHTHALMOLOGY

## 2023-09-25 PROCEDURE — 3288F FALL RISK ASSESSMENT DOCD: CPT | Mod: HCNC,CPTII,S$GLB, | Performed by: OPHTHALMOLOGY

## 2023-09-25 PROCEDURE — 1159F MED LIST DOCD IN RCRD: CPT | Mod: HCNC,CPTII,S$GLB, | Performed by: OPHTHALMOLOGY

## 2023-09-25 PROCEDURE — 1101F PT FALLS ASSESS-DOCD LE1/YR: CPT | Mod: HCNC,CPTII,S$GLB, | Performed by: OPHTHALMOLOGY

## 2023-09-25 PROCEDURE — 1159F PR MEDICATION LIST DOCUMENTED IN MEDICAL RECORD: ICD-10-PCS | Mod: HCNC,CPTII,S$GLB, | Performed by: OPHTHALMOLOGY

## 2023-09-25 PROCEDURE — 3288F PR FALLS RISK ASSESSMENT DOCUMENTED: ICD-10-PCS | Mod: HCNC,CPTII,S$GLB, | Performed by: OPHTHALMOLOGY

## 2023-09-25 PROCEDURE — 99214 OFFICE O/P EST MOD 30 MIN: CPT | Mod: HCNC,S$GLB,, | Performed by: OPHTHALMOLOGY

## 2023-09-25 PROCEDURE — 1101F PR PT FALLS ASSESS DOC 0-1 FALLS W/OUT INJ PAST YR: ICD-10-PCS | Mod: HCNC,CPTII,S$GLB, | Performed by: OPHTHALMOLOGY

## 2023-09-25 PROCEDURE — 1126F AMNT PAIN NOTED NONE PRSNT: CPT | Mod: HCNC,CPTII,S$GLB, | Performed by: OPHTHALMOLOGY

## 2023-09-25 PROCEDURE — 1160F PR REVIEW ALL MEDS BY PRESCRIBER/CLIN PHARMACIST DOCUMENTED: ICD-10-PCS | Mod: HCNC,CPTII,S$GLB, | Performed by: OPHTHALMOLOGY

## 2023-10-03 ENCOUNTER — TELEPHONE (OUTPATIENT)
Dept: INTERNAL MEDICINE | Facility: CLINIC | Age: 78
End: 2023-10-03
Payer: MEDICARE

## 2023-12-21 ENCOUNTER — LAB VISIT (OUTPATIENT)
Dept: LAB | Facility: HOSPITAL | Age: 78
End: 2023-12-21
Attending: FAMILY MEDICINE
Payer: MEDICARE

## 2023-12-21 ENCOUNTER — OFFICE VISIT (OUTPATIENT)
Dept: INTERNAL MEDICINE | Facility: CLINIC | Age: 78
End: 2023-12-21
Payer: MEDICARE

## 2023-12-21 VITALS
WEIGHT: 149.06 LBS | OXYGEN SATURATION: 97 % | DIASTOLIC BLOOD PRESSURE: 80 MMHG | BODY MASS INDEX: 23.95 KG/M2 | HEIGHT: 66 IN | SYSTOLIC BLOOD PRESSURE: 135 MMHG | HEART RATE: 87 BPM

## 2023-12-21 DIAGNOSIS — E11.69 HYPERLIPIDEMIA ASSOCIATED WITH TYPE 2 DIABETES MELLITUS: ICD-10-CM

## 2023-12-21 DIAGNOSIS — E78.5 HYPERLIPIDEMIA ASSOCIATED WITH TYPE 2 DIABETES MELLITUS: ICD-10-CM

## 2023-12-21 DIAGNOSIS — I10 ESSENTIAL HYPERTENSION: ICD-10-CM

## 2023-12-21 DIAGNOSIS — Z12.5 PROSTATE CANCER SCREENING: ICD-10-CM

## 2023-12-21 DIAGNOSIS — E11.9 TYPE 2 DIABETES MELLITUS WITHOUT COMPLICATION, WITHOUT LONG-TERM CURRENT USE OF INSULIN: ICD-10-CM

## 2023-12-21 DIAGNOSIS — Z00.00 ANNUAL PHYSICAL EXAM: Primary | ICD-10-CM

## 2023-12-21 DIAGNOSIS — Z00.00 ANNUAL PHYSICAL EXAM: ICD-10-CM

## 2023-12-21 DIAGNOSIS — Z79.899 ENCOUNTER FOR LONG-TERM (CURRENT) USE OF OTHER MEDICATIONS: ICD-10-CM

## 2023-12-21 PROBLEM — D69.2 SENILE PURPURA: Status: RESOLVED | Noted: 2021-03-25 | Resolved: 2023-12-21

## 2023-12-21 LAB
ALBUMIN/CREAT UR: 44.4 UG/MG (ref 0–30)
CREAT UR-MCNC: 45 MG/DL (ref 23–375)
MICROALBUMIN UR DL<=1MG/L-MCNC: 20 UG/ML

## 2023-12-21 PROCEDURE — 1159F PR MEDICATION LIST DOCUMENTED IN MEDICAL RECORD: ICD-10-PCS | Mod: HCNC,CPTII,S$GLB, | Performed by: FAMILY MEDICINE

## 2023-12-21 PROCEDURE — 1159F MED LIST DOCD IN RCRD: CPT | Mod: HCNC,CPTII,S$GLB, | Performed by: FAMILY MEDICINE

## 2023-12-21 PROCEDURE — 99999 PR PBB SHADOW E&M-EST. PATIENT-LVL III: CPT | Mod: PBBFAC,HCNC,, | Performed by: FAMILY MEDICINE

## 2023-12-21 PROCEDURE — 1160F PR REVIEW ALL MEDS BY PRESCRIBER/CLIN PHARMACIST DOCUMENTED: ICD-10-PCS | Mod: HCNC,CPTII,S$GLB, | Performed by: FAMILY MEDICINE

## 2023-12-21 PROCEDURE — 1160F RVW MEDS BY RX/DR IN RCRD: CPT | Mod: HCNC,CPTII,S$GLB, | Performed by: FAMILY MEDICINE

## 2023-12-21 PROCEDURE — 82043 UR ALBUMIN QUANTITATIVE: CPT | Mod: HCNC | Performed by: FAMILY MEDICINE

## 2023-12-21 PROCEDURE — 3075F SYST BP GE 130 - 139MM HG: CPT | Mod: HCNC,CPTII,S$GLB, | Performed by: FAMILY MEDICINE

## 2023-12-21 PROCEDURE — 99999 PR PBB SHADOW E&M-EST. PATIENT-LVL III: ICD-10-PCS | Mod: PBBFAC,HCNC,, | Performed by: FAMILY MEDICINE

## 2023-12-21 PROCEDURE — 3075F PR MOST RECENT SYSTOLIC BLOOD PRESS GE 130-139MM HG: ICD-10-PCS | Mod: HCNC,CPTII,S$GLB, | Performed by: FAMILY MEDICINE

## 2023-12-21 PROCEDURE — 3079F PR MOST RECENT DIASTOLIC BLOOD PRESSURE 80-89 MM HG: ICD-10-PCS | Mod: HCNC,CPTII,S$GLB, | Performed by: FAMILY MEDICINE

## 2023-12-21 PROCEDURE — 3079F DIAST BP 80-89 MM HG: CPT | Mod: HCNC,CPTII,S$GLB, | Performed by: FAMILY MEDICINE

## 2023-12-21 PROCEDURE — 99397 PER PM REEVAL EST PAT 65+ YR: CPT | Mod: HCNC,S$GLB,, | Performed by: FAMILY MEDICINE

## 2023-12-21 PROCEDURE — 99397 PR PREVENTIVE VISIT,EST,65 & OVER: ICD-10-PCS | Mod: HCNC,S$GLB,, | Performed by: FAMILY MEDICINE

## 2023-12-21 NOTE — PROGRESS NOTES
Subjective:       Patient ID: Yong Harmon is a 78 y.o. male.    Chief Complaint: No chief complaint on file.    HPI    Yong Harmon is a 78 y.o. male for checkup.      #Cards: HTN, HLD  - reg: Pravastatin 40 qd  - not currently taking anti-bp regimen, h/o hypotension w/ meds  - checks bp at home     #Endo: DM (A1c 4/2022 = 6.0) w/ microalb  - eye exam: est w/ ophtho,  9/2023  - urine due     #GI: H/o H. Pylori infxn per pt report, Dyspepsia  - relief w/ pepcid     #Heme: Anemia, Thrombocytosis  - est w/ Dr. Coker / Dr. Leal,  11/2019  - takes iron - constipates; rec to take 3xwkly    #Neuro: Memory changes  - est w/ Neuropsych / Dr. Killian,  8/2021     #Ophtho: Glaucoma  - est w/ Dr. Grace,  9/2023 - upcoming 1/2024    #Social:  - goes to [employer] [place] [employer]; checks bp there, on avg 140s/80s    Review of Systems   Constitutional:  Negative for activity change and unexpected weight change.   HENT:  Positive for hearing loss. Negative for rhinorrhea and trouble swallowing.    Eyes:  Negative for discharge and visual disturbance.   Respiratory:  Negative for chest tightness and wheezing.    Cardiovascular:  Negative for chest pain and palpitations.   Gastrointestinal:  Negative for blood in stool, constipation, diarrhea and vomiting.   Endocrine: Negative for polydipsia and polyuria.   Genitourinary:  Negative for difficulty urinating, hematuria and urgency.   Musculoskeletal:  Negative for arthralgias, joint swelling and neck pain.   Neurological:  Negative for weakness and headaches.   Psychiatric/Behavioral:  Negative for confusion and dysphoric mood.          Past Medical History:   Diagnosis Date    Alcohol dependence     Diabetes mellitus type II     Diabetic polyneuropathy associated with type 2 diabetes mellitus 3/21/2016    Diverticulosis of colon 2014    Glaucoma     Hyperlipidemia     Hypertension     Stage 2 chronic kidney disease 10/11/2018    Stage 2 chronic kidney disease due to  "type 2 diabetes mellitus     Type 2 diabetes mellitus with ophthalmic manifestations         Prior to Admission medications    Medication Sig Start Date End Date Taking? Authorizing Provider   ascorbic acid (VITAMIN C) 500 MG tablet Take 500 mg by mouth once daily.   Yes Provider, Historical   aspirin (ECOTRIN) 81 MG EC tablet Take 81 mg by mouth once daily.   Yes Provider, Historical   CALCIUM CARBONATE/VITAMIN D3 (CALCIUM 500 + D ORAL) Take 1 tablet by mouth once daily.   Yes Provider, Historical   cyanocobalamin (VITAMIN B-12) 100 MCG tablet Take 100 mcg by mouth once daily.   Yes Provider, Historical   latanoprost 0.005 % ophthalmic solution Place 1 drop into both eyes every evening. 7/6/23  Yes Lona Grace MD   MULTIVIT-MIN/FA/LYCOPENE/LUT (CENTRUM SILVER ULTRA MEN'S ORAL) Take 1 tablet by mouth once daily.   Yes Provider, Historical   potassium 99 mg Tab Take 1 tablet by mouth once daily. Using OTC potassium   Yes Provider, Historical   pravastatin (PRAVACHOL) 40 MG tablet TAKE 1 TABLET BY MOUTH EVERY DAY 5/1/21  Yes Naresh Verdin MD   vitamin E 100 UNIT capsule Take 100 Units by mouth once daily.   Yes Provider, Historical   fish oil-omega-3 fatty acids 300-1,000 mg capsule Take 1 capsule by mouth once daily.     Provider, Historical        Past medical history, surgical history, and family medical history reviewed and updated as appropriate.    Medications and allergies reviewed.     Objective:          Vitals:    12/21/23 1339 12/21/23 1420   BP: (!) 150/90 135/80   BP Location: Left arm    Patient Position: Sitting    BP Method: Large (Manual)    Pulse: 87    SpO2: 97%    Weight: 67.6 kg (149 lb 0.5 oz)    Height: 5' 6" (1.676 m)      Body mass index is 24.05 kg/m².  Physical Exam  Vitals and nursing note reviewed.   Constitutional:       General: He is not in acute distress.     Appearance: Normal appearance. He is well-developed.   Eyes:      Extraocular Movements: Extraocular movements " intact.   Cardiovascular:      Rate and Rhythm: Normal rate and regular rhythm.      Pulses: Normal pulses.      Heart sounds: Normal heart sounds. No murmur heard.  Pulmonary:      Effort: Pulmonary effort is normal. No respiratory distress.      Breath sounds: Normal breath sounds. No wheezing.   Neurological:      Mental Status: He is alert and oriented to person, place, and time.   Psychiatric:         Mood and Affect: Mood normal.         Behavior: Behavior normal.         Lab Results   Component Value Date    WBC 7.30 07/16/2021    HGB 11.1 (L) 07/16/2021    HCT 36.1 (L) 07/16/2021     (H) 07/16/2021    CHOL 186 04/20/2022    TRIG 74 04/20/2022    HDL 56 04/20/2022    ALT 15 03/25/2021    AST 18 03/25/2021     (L) 04/20/2022    K 4.3 04/20/2022    CL 96 04/20/2022    CREATININE 0.8 04/20/2022    BUN 8 04/20/2022    CO2 28 04/20/2022    TSH 2.073 03/21/2016    PSA 1.7 03/21/2016    HGBA1C 6.0 (H) 04/20/2022       Assessment:       1. Annual physical exam    2. Encounter for long-term (current) use of other medications    3. Prostate cancer screening    4. Essential hypertension    5. Hyperlipidemia associated with type 2 diabetes mellitus    6. Type 2 diabetes mellitus without complication, without long-term current use of insulin          Plan:   1. Annual physical exam  -     Comprehensive Metabolic Panel; Future; Expected date: 12/21/2023  -     CBC Without Differential; Future; Expected date: 12/21/2023  -     Lipid Panel; Future; Expected date: 12/21/2023  -     Hemoglobin A1C; Future; Expected date: 12/21/2023  -     TSH; Future; Expected date: 12/21/2023  -     Microalbumin/Creatinine Ratio, Urine; Future; Expected date: 12/21/2023    2. Encounter for long-term (current) use of other medications  -     Comprehensive Metabolic Panel; Future; Expected date: 12/21/2023  -     CBC Without Differential; Future; Expected date: 12/21/2023  -     Lipid Panel; Future; Expected date: 12/21/2023  -      Hemoglobin A1C; Future; Expected date: 12/21/2023  -     TSH; Future; Expected date: 12/21/2023  -     Microalbumin/Creatinine Ratio, Urine; Future; Expected date: 12/21/2023    3. Prostate cancer screening  -     PSA, Screening; Future; Expected date: 12/21/2023    4. Essential hypertension    5. Hyperlipidemia associated with type 2 diabetes mellitus  Overview:  rec statin regularly      6. Type 2 diabetes mellitus without complication, without long-term current use of insulin  Overview:  Questionable history, maybe diagnosed many years ago, a1c well controlled, not curr on meds for glucose control and healthy lifestyle encouraged, rec statin regularly.          Health maintenance reviewed with patient.     Follow up in about 1 year (around 12/21/2024) for Annual.    Randal Spence MD  Family Medicine / Primary Care  [facility]  12/21/2023

## 2024-01-16 NOTE — PROGRESS NOTES
HPI    DLS: 9/25/2023    Pt here for HVF review/OCT;  Pt states no eye pain but OS been itchy. Pt denies any vision changes.     Meds:  Latanoprost QHS OU    1. Severe LTG  OU   2. VF Defects  OU   3. Pterygium OS   4. PC O OD   5. PCIOL OU   6. Type 2 DM no    7. Yag Cap OS       Last edited by Donna Matson on 1/22/2024  2:52 PM.              Assessment /Plan     For exam results, see Encounter Report.    Low-tension glaucoma of both eyes, severe stage    Scotoma, central, bilateral    PCO (posterior capsular opacification), bilateral    Pterygium of both eyes - Both Eyes    Diabetes mellitus type 2 without retinopathy    Pseudophakia - Both Eyes        F/U SLT OS ( 8/17/2023) (NONE OD NEEDED TO DATE)     LOST TO F/U 1/2014 TO 1/2016 - 2 YEARS     Low Tension Glaucoma - severe stage ou  Begun on gtts 1999  First [id number]  First photos 1999           Family history    neg        Glaucoma meds    Latanoprost ou - ( use to use trusopt ou bid and alphagan)        H/O adverse rxn to glaucoma drops    Intol to timolol - heart palpitations // intol rocklatan - eyelid irritation         LASERS    ALT OD 9/15/2003 // yag cap os 3/22/2018 // SLT os 8/17/2023 - good resp 19--> 16        GLAUCOMA SURGERIES    none        OTHER EYE SURGERIES    PC IOL od 1/28/2009 // os 11/6/2009        CDR    0.8 w/ inf pit / 0.7 w/ inf notch (h/o sup disc heme os 8/22/2008)        Tbase    16-20 ou          Tmax    20/20            Ttarget    14/14             HVF    18 test 2000 to 2024 OD: sup alt defect, split fixation; OS: SAD with split fixation (? prog os )         Gonio    +3 ou        CCT    553/555        OCT   3 tests  2020 to 2024 - OD:hannah Pedersen G/T   // OS:dec G/T/TI        HRT    7 test 2004 to 2020 - MR -  Dec. S/I od // dec. Ihannah os /// CDR 0.75 od // 0.73 os        Disc photos    1999, 1005 - slides // 2008, 210, 2012, 2016, 2019   - OIS    - Ttoday 14/15   (- target is 14 ou (long H/O poor compliance with drops )   -  Test done today  IOP // HVF / DFE / OCT     2.  VF defects arcuate w/ split fixation ou - 2/2 #1    3.  PCO ou - Vis sign os    + decrease in VA with BAT ou -    S/P yag cap os 3/22/2018    Can have yag cap od prn - cont to monitor- central vis axis still clear      4.  PC IOL OU       OD 1/28/2009        OS 11/6/2008 - s/p yag cap 3/22/2018 - good response    5. Pterygium OS - causing  a FB sensation - AT's prn    Also small one od - ?? Previous excision     6. DM- no DR on exam    7. H/O malaria - post [place] - decades ago at age 23   Recovered - used hydroxychloroquine a long time ago     PLAN   target - 14/14  ou   Just at target od but above target os ( 14/15)   Did Not tolerating rocklatan well - - STOP   Cont  to latanoprost  ou       + PCO OD   - can have yag cap prn     cont AT's  PRN    Pt is a  candidate for durysta prn     -  also consider durysta   Vs  Brimonidine vs -   trail of dorzolamide     Also once IOP controlled - consider yag cap od     SLT os - done 8/17/2023 -  good restp 19--> 16 os   No need for slt od at this time      Rec yag cap od - pt wants to wait   F/U 4 months with AR/MR/BAT od - PCO check od     I have seen and personally examined the patient.  I agree with the findings, assessment and plan of the resident and/or fellow.     Lona Garce MD

## 2024-01-22 ENCOUNTER — OFFICE VISIT (OUTPATIENT)
Dept: OPHTHALMOLOGY | Facility: CLINIC | Age: 79
End: 2024-01-22
Payer: MEDICARE

## 2024-01-22 ENCOUNTER — CLINICAL SUPPORT (OUTPATIENT)
Dept: OPHTHALMOLOGY | Facility: CLINIC | Age: 79
End: 2024-01-22
Payer: MEDICARE

## 2024-01-22 DIAGNOSIS — E11.9 DIABETES MELLITUS TYPE 2 WITHOUT RETINOPATHY: ICD-10-CM

## 2024-01-22 DIAGNOSIS — H11.003 PTERYGIUM OF BOTH EYES: ICD-10-CM

## 2024-01-22 DIAGNOSIS — H26.493 PCO (POSTERIOR CAPSULAR OPACIFICATION), BILATERAL: ICD-10-CM

## 2024-01-22 DIAGNOSIS — H53.413 SCOTOMA, CENTRAL, BILATERAL: ICD-10-CM

## 2024-01-22 DIAGNOSIS — H40.1233 LOW-TENSION GLAUCOMA OF BOTH EYES, SEVERE STAGE: ICD-10-CM

## 2024-01-22 DIAGNOSIS — H40.1233 LOW-TENSION GLAUCOMA OF BOTH EYES, SEVERE STAGE: Primary | ICD-10-CM

## 2024-01-22 DIAGNOSIS — Z96.1 PSEUDOPHAKIA: ICD-10-CM

## 2024-01-22 PROCEDURE — 99214 OFFICE O/P EST MOD 30 MIN: CPT | Mod: HCNC,S$GLB,, | Performed by: OPHTHALMOLOGY

## 2024-01-22 PROCEDURE — 1159F MED LIST DOCD IN RCRD: CPT | Mod: HCNC,CPTII,S$GLB, | Performed by: OPHTHALMOLOGY

## 2024-01-22 PROCEDURE — 3288F FALL RISK ASSESSMENT DOCD: CPT | Mod: HCNC,CPTII,S$GLB, | Performed by: OPHTHALMOLOGY

## 2024-01-22 PROCEDURE — 99999 PR PBB SHADOW E&M-EST. PATIENT-LVL III: CPT | Mod: PBBFAC,HCNC,, | Performed by: OPHTHALMOLOGY

## 2024-01-22 PROCEDURE — 1101F PT FALLS ASSESS-DOCD LE1/YR: CPT | Mod: HCNC,CPTII,S$GLB, | Performed by: OPHTHALMOLOGY

## 2024-01-22 PROCEDURE — 2023F DILAT RTA XM W/O RTNOPTHY: CPT | Mod: HCNC,CPTII,S$GLB, | Performed by: OPHTHALMOLOGY

## 2024-01-22 PROCEDURE — 1126F AMNT PAIN NOTED NONE PRSNT: CPT | Mod: HCNC,CPTII,S$GLB, | Performed by: OPHTHALMOLOGY

## 2024-01-22 PROCEDURE — 1160F RVW MEDS BY RX/DR IN RCRD: CPT | Mod: HCNC,CPTII,S$GLB, | Performed by: OPHTHALMOLOGY

## 2024-02-25 ENCOUNTER — PATIENT MESSAGE (OUTPATIENT)
Dept: ADMINISTRATIVE | Facility: OTHER | Age: 79
End: 2024-02-25
Payer: MEDICARE

## 2024-04-11 DIAGNOSIS — H40.1233 LOW-TENSION GLAUCOMA OF BOTH EYES, SEVERE STAGE: ICD-10-CM

## 2024-04-11 RX ORDER — LATANOPROST 50 UG/ML
1 SOLUTION/ DROPS OPHTHALMIC NIGHTLY
Qty: 7.5 ML | Refills: 3 | Status: SHIPPED | OUTPATIENT
Start: 2024-04-11

## 2024-05-06 NOTE — TELEPHONE ENCOUNTER
----- Message from Randal Spence MD sent at 10/2/2023  2:42 PM CDT -----  Due for checkup    
Called pt to schedule annual no answer LVM   
Pts wife stated that she will make his appointment pt wasn't home  
1

## 2024-05-23 NOTE — PROGRESS NOTES
HPI    DLS: 1/22/2024    Pt here HVF review/OCT:  Pt states no eye pain or discomfort and vision is about the same.     Meds;  Latanoprost QHS OU    1. Severe LTG  OU   2. VF Defects  OU   3. Pterygium OS   4. PCO OD   5. PCIOL OU   6. Type 2 DM no     7. Yag Cap OS       Last edited by Lona Grace MD on 5/27/2024  2:19 PM.              Assessment /Plan     For exam results, see Encounter Report.    Low-tension glaucoma of both eyes, severe stage    Scotoma, central, bilateral    PCO (posterior capsular opacification), bilateral    Pterygium of both eyes - Both Eyes    Diabetes mellitus type 2 without retinopathy    Pseudophakia - Both Eyes        F/U SLT OS ( 8/17/2023) (NONE OD NEEDED TO DATE)     LOST TO F/U 1/2014 TO 1/2016 - 2 YEARS     Low Tension Glaucoma - severe stage ou  Begun on gtts 1999  First [id number]  First photos 1999           Family history    neg        Glaucoma meds    Latanoprost ou - ( use to use trusopt ou bid and alphagan)        H/O adverse rxn to glaucoma drops    Intol to timolol - heart palpitations // intol rocklatan - eyelid irritation         LASERS    ALT OD 9/15/2003 // yag cap os 3/22/2018 // SLT os 8/17/2023 - good resp 19--> 16        GLAUCOMA SURGERIES    none        OTHER EYE SURGERIES    PC IOL od 1/28/2009 // os 11/6/2009        CDR    0.8 w/ inf pit / 0.7 w/ inf notch (h/o sup disc heme os 8/22/2008)        Tbase    16-20 ou          Tmax    20/20            Ttarget    14/14             HVF    18 test 2000 to 2024 OD: sup alt defect, split fixation; OS: SAD with split fixation (? prog os )         Gonio    +3 ou        CCT    553/555        OCT   3 tests  2020 to 2024 - OD:hannah Pedersen G/T   // OS:dec G/T/TI        HRT    7 test 2004 to 2020 - MR -  Dec. S/I od // dec. Ihannah os /// CDR 0.75 od // 0.73 os        Disc photos    1999, 1005 - slides // 2008, 210, 2012, 2016, 2019   - OIS    - Ttoday 14/14  (- target is 14 ou (long H/O poor compliance with drops )    - Test done today  IOP // PCO check od    2.  VF defects arcuate w/ split fixation ou - 2/2 #1    3.  PCO ou - Vis sign os    + decrease in VA with BAT ou -    S/P yag cap os 3/22/2018    Can have yag cap od prn - cont to monitor- central vis axis still clear      4.  PC IOL OU       OD 1/28/2009        OS 11/6/2008 - s/p yag cap 3/22/2018 - good response    5. Pterygium OS - causing  a FB sensation - AT's prn    Also small one od - ?? Previous excision     6. DM- no DR on exam    7. H/O malaria - post [place] - decades ago at age 23   Recovered - used hydroxychloroquine a long time ago     PLAN   target - 14/14  ou   Just at target ou (5/2024)  Did Not tolerating rocklatan well - - STAY OFF    Cont  to latanoprost  ou     + PCO OD   - can have yag cap prn     cont AT's  PRN    Pt is a  candidate for durysta prn     -  also consider durysta   Vs  Brimonidine vs -   trial of dorzolamide     Also once IOP controlled - consider yag cap od     SLT os - done 8/17/2023 -  good restp 19--> 16 os   No need for slt od at this time    Rec yag cap od - pt wants to wait     F/U 4 months with IOP / gonio     I have seen and personally examined the patient.  I agree with the findings, assessment and plan of the resident and/or fellow.     Lona Grace MD

## 2024-05-27 ENCOUNTER — OFFICE VISIT (OUTPATIENT)
Dept: OPHTHALMOLOGY | Facility: CLINIC | Age: 79
End: 2024-05-27
Payer: MEDICARE

## 2024-05-27 DIAGNOSIS — Z96.1 PSEUDOPHAKIA: ICD-10-CM

## 2024-05-27 DIAGNOSIS — H53.413 SCOTOMA, CENTRAL, BILATERAL: ICD-10-CM

## 2024-05-27 DIAGNOSIS — H11.003 PTERYGIUM OF BOTH EYES: ICD-10-CM

## 2024-05-27 DIAGNOSIS — H26.493 PCO (POSTERIOR CAPSULAR OPACIFICATION), BILATERAL: ICD-10-CM

## 2024-05-27 DIAGNOSIS — E11.9 DIABETES MELLITUS TYPE 2 WITHOUT RETINOPATHY: ICD-10-CM

## 2024-05-27 DIAGNOSIS — H40.1233 LOW-TENSION GLAUCOMA OF BOTH EYES, SEVERE STAGE: Primary | ICD-10-CM

## 2024-05-27 PROCEDURE — 1160F RVW MEDS BY RX/DR IN RCRD: CPT | Mod: HCNC,CPTII,S$GLB, | Performed by: OPHTHALMOLOGY

## 2024-05-27 PROCEDURE — 3288F FALL RISK ASSESSMENT DOCD: CPT | Mod: HCNC,CPTII,S$GLB, | Performed by: OPHTHALMOLOGY

## 2024-05-27 PROCEDURE — 99214 OFFICE O/P EST MOD 30 MIN: CPT | Mod: HCNC,S$GLB,, | Performed by: OPHTHALMOLOGY

## 2024-05-27 PROCEDURE — 99999 PR PBB SHADOW E&M-EST. PATIENT-LVL III: CPT | Mod: PBBFAC,HCNC,, | Performed by: OPHTHALMOLOGY

## 2024-05-27 PROCEDURE — 1101F PT FALLS ASSESS-DOCD LE1/YR: CPT | Mod: HCNC,CPTII,S$GLB, | Performed by: OPHTHALMOLOGY

## 2024-05-27 PROCEDURE — 1126F AMNT PAIN NOTED NONE PRSNT: CPT | Mod: HCNC,CPTII,S$GLB, | Performed by: OPHTHALMOLOGY

## 2024-05-27 PROCEDURE — 1159F MED LIST DOCD IN RCRD: CPT | Mod: HCNC,CPTII,S$GLB, | Performed by: OPHTHALMOLOGY

## 2024-09-28 NOTE — PROGRESS NOTES
HPI    DLS: 5/27/2024    Pt here for 4 Month IOP Check;  Pt states no eye pain or discomfort and vision seems to be about the same.       Meds;  Latanoprost QHS OU    1. Severe LTG  OU   2. VF Defects  OU   3. Pterygium OS   4. PCO OD   5. PCIOL OU   6. Type 2 DM no     7. Yag  Cap OS       Last edited by Donna Matson on 9/30/2024  9:58 AM.            Assessment /Plan     For exam results, see Encounter Report.    Low-tension glaucoma of both eyes, severe stage    Scotoma, central, bilateral    PCO (posterior capsular opacification), bilateral    Pterygium of both eyes - Both Eyes    Diabetes mellitus type 2 without retinopathy    Pseudophakia - Both Eyes        F/U SLT OS ( 8/17/2023) (NONE OD NEEDED TO DATE)     LOST TO F/U 1/2014 TO 1/2016 - 2 YEARS     Low Tension Glaucoma - severe stage ou  Begun on gtts 1999  First [id number]  First photos 1999           Family history    neg        Glaucoma meds    Latanoprost ou - ( use to use trusopt ou bid and alphagan)        H/O adverse rxn to glaucoma drops    Intol to timolol - heart palpitations // intol rocklatan - eyelid irritation         LASERS    ALT OD 9/15/2003 // yag cap os 3/22/2018 // SLT os 8/17/2023 - good resp 19--> 16        GLAUCOMA SURGERIES    none        OTHER EYE SURGERIES    PC IOL od 1/28/2009 // os 11/6/2009        CDR    0.8 w/ inf pit / 0.7 w/ inf notch (h/o sup disc heme os 8/22/2008)        Tbase    16-20 ou          Tmax    20/20            Ttarget    14/14             HVF    18 test 2000 to 2024 OD: sup alt defect, split fixation; OS: SAD with split fixation (? prog os )         Gonio    +3 ou        CCT    553/555        OCT   3 tests  2020 to 2024 - OD:hannah Pedersen G/T   // OS:dec G/T/TI        HRT    7 test 2004 to 2020 - MR -  Dec. S/I od // dec. Ihannah os /// CDR 0.75 od // 0.73 os        Disc photos    1999, 1005 - slides // 2008, 210, 2012, 2016, 2019   - OIS    - Ttoday 14/12 (- target is 14 ou (long H/O poor compliance with drops  )   - Test done today  IOP // PCO check od// gonio     2.  VF defects arcuate w/ split fixation ou - 2/2 #1    3.  PCO ou - Vis sign os    + decrease in VA with BAT ou -    S/P yag cap os 3/22/2018    Can have yag cap od prn - cont to monitor- central vis axis still clear      4.  PC IOL OU       OD 1/28/2009        OS 11/6/2008 - s/p yag cap 3/22/2018 - good response    5. Pterygium OS - causing  a FB sensation - AT's prn    Also small one od - ?? Previous excision     6. DM- no DR on exam    7. H/O malaria - [place] - decades ago at age 23   Recovered - used hydroxychloroquine a long time ago     PLAN   target - 14/14  ou   Just at target ou (5/2024)  Did Not tolerating rocklatan well - - STAY OFF    Cont  to latanoprost  ou     + PCO OD   - can have yag cap prn     cont AT's  PRN    Pt is a  candidate for durysta prn     -  also consider durysta   Vs  Brimonidine vs -   trial of dorzolamide     SLT os - done 8/17/2023 -  good restp 19--> 16 os   No need for slt od at this time    Rec yag cap od -schedule ( dense PCO od and SALT w/ split fix)     After yag cap od can F/u in  4 months with IOP / HVF / DFE / OCT     I have seen and personally examined the patient.  I agree with the findings, assessment and plan of the resident and/or fellow.     Lona Grace MD

## 2024-09-30 ENCOUNTER — OFFICE VISIT (OUTPATIENT)
Dept: OPHTHALMOLOGY | Facility: CLINIC | Age: 79
End: 2024-09-30
Payer: MEDICARE

## 2024-09-30 DIAGNOSIS — H40.1233 LOW-TENSION GLAUCOMA OF BOTH EYES, SEVERE STAGE: Primary | ICD-10-CM

## 2024-09-30 DIAGNOSIS — E11.9 DIABETES MELLITUS TYPE 2 WITHOUT RETINOPATHY: ICD-10-CM

## 2024-09-30 DIAGNOSIS — H26.493 PCO (POSTERIOR CAPSULAR OPACIFICATION), BILATERAL: ICD-10-CM

## 2024-09-30 DIAGNOSIS — H53.413 SCOTOMA, CENTRAL, BILATERAL: ICD-10-CM

## 2024-09-30 DIAGNOSIS — H11.003 PTERYGIUM OF BOTH EYES: ICD-10-CM

## 2024-09-30 DIAGNOSIS — Z96.1 PSEUDOPHAKIA: ICD-10-CM

## 2024-09-30 PROCEDURE — 1159F MED LIST DOCD IN RCRD: CPT | Mod: HCNC,CPTII,S$GLB, | Performed by: OPHTHALMOLOGY

## 2024-09-30 PROCEDURE — 92020 GONIOSCOPY: CPT | Mod: HCNC,S$GLB,, | Performed by: OPHTHALMOLOGY

## 2024-09-30 PROCEDURE — 3288F FALL RISK ASSESSMENT DOCD: CPT | Mod: HCNC,CPTII,S$GLB, | Performed by: OPHTHALMOLOGY

## 2024-09-30 PROCEDURE — 1126F AMNT PAIN NOTED NONE PRSNT: CPT | Mod: HCNC,CPTII,S$GLB, | Performed by: OPHTHALMOLOGY

## 2024-09-30 PROCEDURE — 1160F RVW MEDS BY RX/DR IN RCRD: CPT | Mod: HCNC,CPTII,S$GLB, | Performed by: OPHTHALMOLOGY

## 2024-09-30 PROCEDURE — 1101F PT FALLS ASSESS-DOCD LE1/YR: CPT | Mod: HCNC,CPTII,S$GLB, | Performed by: OPHTHALMOLOGY

## 2024-09-30 PROCEDURE — 99214 OFFICE O/P EST MOD 30 MIN: CPT | Mod: HCNC,S$GLB,, | Performed by: OPHTHALMOLOGY

## 2024-09-30 PROCEDURE — 99999 PR PBB SHADOW E&M-EST. PATIENT-LVL III: CPT | Mod: PBBFAC,HCNC,, | Performed by: OPHTHALMOLOGY

## 2024-10-16 ENCOUNTER — LAB VISIT (OUTPATIENT)
Dept: LAB | Facility: HOSPITAL | Age: 79
End: 2024-10-16
Attending: FAMILY MEDICINE
Payer: MEDICARE

## 2024-10-16 ENCOUNTER — OFFICE VISIT (OUTPATIENT)
Dept: INTERNAL MEDICINE | Facility: CLINIC | Age: 79
End: 2024-10-16
Payer: MEDICARE

## 2024-10-16 VITALS
BODY MASS INDEX: 23.99 KG/M2 | HEART RATE: 70 BPM | HEIGHT: 66 IN | SYSTOLIC BLOOD PRESSURE: 132 MMHG | DIASTOLIC BLOOD PRESSURE: 78 MMHG | WEIGHT: 149.25 LBS | OXYGEN SATURATION: 100 %

## 2024-10-16 DIAGNOSIS — Z00.00 ANNUAL PHYSICAL EXAM: ICD-10-CM

## 2024-10-16 DIAGNOSIS — Z79.899 DRUG THERAPY: ICD-10-CM

## 2024-10-16 DIAGNOSIS — R97.20 ELEVATED PSA, LESS THAN 10 NG/ML: ICD-10-CM

## 2024-10-16 DIAGNOSIS — Z12.5 PROSTATE CANCER SCREENING: ICD-10-CM

## 2024-10-16 DIAGNOSIS — Z00.00 ANNUAL PHYSICAL EXAM: Primary | ICD-10-CM

## 2024-10-16 DIAGNOSIS — I10 ESSENTIAL HYPERTENSION: ICD-10-CM

## 2024-10-16 DIAGNOSIS — E11.69 HYPERLIPIDEMIA ASSOCIATED WITH TYPE 2 DIABETES MELLITUS: ICD-10-CM

## 2024-10-16 DIAGNOSIS — E78.5 HYPERLIPIDEMIA ASSOCIATED WITH TYPE 2 DIABETES MELLITUS: ICD-10-CM

## 2024-10-16 LAB
ALBUMIN SERPL BCP-MCNC: 4 G/DL (ref 3.5–5.2)
ALP SERPL-CCNC: 103 U/L (ref 55–135)
ALT SERPL W/O P-5'-P-CCNC: 19 U/L (ref 10–44)
ANION GAP SERPL CALC-SCNC: 10 MMOL/L (ref 8–16)
AST SERPL-CCNC: 25 U/L (ref 10–40)
BILIRUB SERPL-MCNC: 0.7 MG/DL (ref 0.1–1)
BUN SERPL-MCNC: 8 MG/DL (ref 8–23)
CALCIUM SERPL-MCNC: 9.7 MG/DL (ref 8.7–10.5)
CHLORIDE SERPL-SCNC: 97 MMOL/L (ref 95–110)
CHOLEST SERPL-MCNC: 212 MG/DL (ref 120–199)
CHOLEST/HDLC SERPL: 2.2 {RATIO} (ref 2–5)
CO2 SERPL-SCNC: 26 MMOL/L (ref 23–29)
COMPLEXED PSA SERPL-MCNC: 9.9 NG/ML (ref 0–4)
CREAT SERPL-MCNC: 0.8 MG/DL (ref 0.5–1.4)
ERYTHROCYTE [DISTWIDTH] IN BLOOD BY AUTOMATED COUNT: 12.6 % (ref 11.5–14.5)
EST. GFR  (NO RACE VARIABLE): >60 ML/MIN/1.73 M^2
ESTIMATED AVG GLUCOSE: 97 MG/DL (ref 68–131)
GLUCOSE SERPL-MCNC: 87 MG/DL (ref 70–110)
HBA1C MFR BLD: 5 % (ref 4–5.6)
HCT VFR BLD AUTO: 44.7 % (ref 40–54)
HDLC SERPL-MCNC: 96 MG/DL (ref 40–75)
HDLC SERPL: 45.3 % (ref 20–50)
HGB BLD-MCNC: 15 G/DL (ref 14–18)
LDLC SERPL CALC-MCNC: 104.6 MG/DL (ref 63–159)
MCH RBC QN AUTO: 33.6 PG (ref 27–31)
MCHC RBC AUTO-ENTMCNC: 33.6 G/DL (ref 32–36)
MCV RBC AUTO: 100 FL (ref 82–98)
NONHDLC SERPL-MCNC: 116 MG/DL
PLATELET # BLD AUTO: 426 K/UL (ref 150–450)
PMV BLD AUTO: 9.7 FL (ref 9.2–12.9)
POTASSIUM SERPL-SCNC: 4.1 MMOL/L (ref 3.5–5.1)
PROT SERPL-MCNC: 8.2 G/DL (ref 6–8.4)
RBC # BLD AUTO: 4.46 M/UL (ref 4.6–6.2)
SODIUM SERPL-SCNC: 133 MMOL/L (ref 136–145)
TRIGL SERPL-MCNC: 57 MG/DL (ref 30–150)
WBC # BLD AUTO: 7.37 K/UL (ref 3.9–12.7)

## 2024-10-16 PROCEDURE — 84153 ASSAY OF PSA TOTAL: CPT | Mod: HCNC | Performed by: FAMILY MEDICINE

## 2024-10-16 PROCEDURE — 80061 LIPID PANEL: CPT | Mod: HCNC | Performed by: FAMILY MEDICINE

## 2024-10-16 PROCEDURE — 36415 COLL VENOUS BLD VENIPUNCTURE: CPT | Mod: HCNC | Performed by: FAMILY MEDICINE

## 2024-10-16 PROCEDURE — 83036 HEMOGLOBIN GLYCOSYLATED A1C: CPT | Mod: HCNC | Performed by: FAMILY MEDICINE

## 2024-10-16 PROCEDURE — 3075F SYST BP GE 130 - 139MM HG: CPT | Mod: HCNC,CPTII,S$GLB, | Performed by: FAMILY MEDICINE

## 2024-10-16 PROCEDURE — 80053 COMPREHEN METABOLIC PANEL: CPT | Mod: HCNC | Performed by: FAMILY MEDICINE

## 2024-10-16 PROCEDURE — 1101F PT FALLS ASSESS-DOCD LE1/YR: CPT | Mod: HCNC,CPTII,S$GLB, | Performed by: FAMILY MEDICINE

## 2024-10-16 PROCEDURE — 99999 PR PBB SHADOW E&M-EST. PATIENT-LVL IV: CPT | Mod: PBBFAC,HCNC,, | Performed by: FAMILY MEDICINE

## 2024-10-16 PROCEDURE — 1159F MED LIST DOCD IN RCRD: CPT | Mod: HCNC,CPTII,S$GLB, | Performed by: FAMILY MEDICINE

## 2024-10-16 PROCEDURE — 85027 COMPLETE CBC AUTOMATED: CPT | Mod: HCNC | Performed by: FAMILY MEDICINE

## 2024-10-16 PROCEDURE — 99397 PER PM REEVAL EST PAT 65+ YR: CPT | Mod: HCNC,S$GLB,, | Performed by: FAMILY MEDICINE

## 2024-10-16 PROCEDURE — 1126F AMNT PAIN NOTED NONE PRSNT: CPT | Mod: HCNC,CPTII,S$GLB, | Performed by: FAMILY MEDICINE

## 2024-10-16 PROCEDURE — 3288F FALL RISK ASSESSMENT DOCD: CPT | Mod: HCNC,CPTII,S$GLB, | Performed by: FAMILY MEDICINE

## 2024-10-16 PROCEDURE — 3078F DIAST BP <80 MM HG: CPT | Mod: HCNC,CPTII,S$GLB, | Performed by: FAMILY MEDICINE

## 2024-10-16 PROCEDURE — 1160F RVW MEDS BY RX/DR IN RCRD: CPT | Mod: HCNC,CPTII,S$GLB, | Performed by: FAMILY MEDICINE

## 2024-10-27 NOTE — PROGRESS NOTES
Requested updates within Care Everywhere.  Patient's chart was reviewed for overdue JUSTYN topics.  Immunizations reconciled.    Orders placed:n/a  Labs Linked:n/a     100

## 2024-10-31 ENCOUNTER — OFFICE VISIT (OUTPATIENT)
Dept: OPHTHALMOLOGY | Facility: CLINIC | Age: 79
End: 2024-10-31
Payer: MEDICARE

## 2024-10-31 DIAGNOSIS — H26.491 PCO (POSTERIOR CAPSULAR OPACIFICATION), RIGHT: Primary | ICD-10-CM

## 2024-10-31 DIAGNOSIS — H11.003 PTERYGIUM OF BOTH EYES: ICD-10-CM

## 2024-10-31 DIAGNOSIS — H53.413 SCOTOMA, CENTRAL, BILATERAL: ICD-10-CM

## 2024-10-31 DIAGNOSIS — H40.1233 LOW-TENSION GLAUCOMA OF BOTH EYES, SEVERE STAGE: ICD-10-CM

## 2024-10-31 DIAGNOSIS — E11.9 DIABETES MELLITUS TYPE 2 WITHOUT RETINOPATHY: ICD-10-CM

## 2024-10-31 DIAGNOSIS — Z96.1 PSEUDOPHAKIA: ICD-10-CM

## 2024-10-31 PROCEDURE — 66821 AFTER CATARACT LASER SURGERY: CPT | Mod: HCNC,RT,S$GLB, | Performed by: OPHTHALMOLOGY

## 2024-10-31 PROCEDURE — 99999 PR PBB SHADOW E&M-EST. PATIENT-LVL III: CPT | Mod: PBBFAC,HCNC,, | Performed by: OPHTHALMOLOGY

## 2024-10-31 PROCEDURE — 99499 UNLISTED E&M SERVICE: CPT | Mod: HCNC,S$GLB,, | Performed by: OPHTHALMOLOGY

## 2024-12-08 NOTE — PROGRESS NOTES
HPI     Glaucoma            Comments: 2 month follow up and pt feels vision OD is better since laser          Comments    F/U yag cap od     DLS: 10/31/24    1. Severe LTG  OU   2. VF Defects  OU   3. Pterygium OS   4. Yag Cap OU  5. PCIOL OU   6. Type 2 DM no DR         MEDS:  Latanoprost QHS OU          Last edited by Lona Grace MD on 12/12/2024 11:06 AM.            Assessment /Plan     For exam results, see Encounter Report.    Low-tension glaucoma of both eyes, severe stage    Scotoma, central, bilateral    Pterygium of both eyes - Both Eyes    Diabetes mellitus type 2 without retinopathy    Pseudophakia - Both Eyes    S/P YAG capsulotomy, right        F/U SLT OS ( 8/17/2023) (NONE OD NEEDED TO DATE)     LOST TO F/U 1/2014 TO 1/2016 - 2 YEARS     Low Tension Glaucoma - severe stage ou  Begun on gtts 1999  First [id number]  First photos 1999           Family history    neg        Glaucoma meds    Latanoprost ou - ( use to use trusopt ou bid and alphagan)        H/O adverse rxn to glaucoma drops    Intol to timolol - heart palpitations // intol rocklatan - eyelid irritation         LASERS    ALT OD 9/15/2003 // yag cap os 3/22/2018 // SLT os 8/17/2023 - good resp 19--> 16 // yag cap od 10/31/2024 - VA improved w/ Yag         GLAUCOMA SURGERIES    none        OTHER EYE SURGERIES    PC IOL od 1/28/2009 // os 11/6/2009        CDR    0.8 w/ inf pit / 0.7 w/ inf notch (h/o sup disc heme os 8/22/2008)        Tbase    16-20 ou          Tmax    20/20            Ttarget    14/14             HVF    18 test 2000 to 2024 OD: sup alt defect, split fixation; OS: SAD with split fixation (? prog os )         Gonio    +3 ou        CCT    553/555        OCT   3 tests  2020 to 2024 - OD:hannah Pedersen G/T   // OS:dec G/T/TI        HRT    7 test 2004 to 2020 - MR -  Dec. S/I od // dec. Ihannah os /// CDR 0.75 od // 0.73 os        Disc photos    1999, 1005 - slides // 2008, 210, 2012, 2016, 2019   - OIS    - Ttoday 13/14   (-  target is 14 ou (long H/O poor compliance with drops )   - Test done today  IOP // f/u  yag cap od - 10/31/2024)     2.  VF defects arcuate w/ split fixation ou - 2/2 #1    3.  PCO ou - Vis sign os    + decrease in VA with BAT ou -    S/P yag cap os 3/22/2018    Can have yag cap od prn - cont to monitor- central vis axis still clear      4.  PC IOL OU       OD 1/28/2009        OS 11/6/2008 - s/p yag cap 3/22/2018 - good response    5. Pterygium OS - causing  a FB sensation - AT's prn    Also small one od - ?? Previous excision     6. DM- no DR on exam    7. H/O malaria - [place] - decades ago at age 23   Recovered - used hydroxychloroquine a long time ago     PLAN   target - 14/14  ou   Just at target ou (5/2024)  Did Not tolerating rocklatan well - - STAY OFF    Cont  to latanoprost  ou     + PCO OD   - can have yag cap prn     cont AT's  PRN    Pt is a  candidate for durysta prn     -  also consider durysta   Vs  Brimonidine vs -   trial of dorzolamide     SLT os - done 8/17/2023 -  good restp 19--> 16 os   No need for slt od at this time     F/u in  4 months with IOP / HVF / DFE / OCT     I have seen and personally examined the patient.  I agree with the findings, assessment and plan of the resident and/or fellow.     Lona Grace MD

## 2024-12-12 ENCOUNTER — OFFICE VISIT (OUTPATIENT)
Dept: OPHTHALMOLOGY | Facility: CLINIC | Age: 79
End: 2024-12-12
Payer: MEDICARE

## 2024-12-12 DIAGNOSIS — H40.1233 LOW-TENSION GLAUCOMA OF BOTH EYES, SEVERE STAGE: Primary | ICD-10-CM

## 2024-12-12 DIAGNOSIS — H11.003 PTERYGIUM OF BOTH EYES: ICD-10-CM

## 2024-12-12 DIAGNOSIS — Z98.890 S/P YAG CAPSULOTOMY, RIGHT: ICD-10-CM

## 2024-12-12 DIAGNOSIS — H53.413 SCOTOMA, CENTRAL, BILATERAL: ICD-10-CM

## 2024-12-12 DIAGNOSIS — E11.9 DIABETES MELLITUS TYPE 2 WITHOUT RETINOPATHY: ICD-10-CM

## 2024-12-12 DIAGNOSIS — Z96.1 PSEUDOPHAKIA: ICD-10-CM

## 2024-12-12 PROCEDURE — 99999 PR PBB SHADOW E&M-EST. PATIENT-LVL III: CPT | Mod: PBBFAC,HCNC,, | Performed by: OPHTHALMOLOGY

## 2025-01-07 NOTE — PROGRESS NOTES
Subjective:       Patient ID: Yong Harmon is a 79 y.o. male.    Chief Complaint: Annual Exam    HPI    Yong Harmon is a 79 y.o. male for checkup.      #Cards: HTN, HLD  - reg: Pravastatin 40 qd  - not currently taking anti-bp regimen, h/o hypotension w/ meds  - checks bp at home     #Endo: DM (A1c 12/2023 = 5.2) w/ microalb  - eye exam: est w/ ophtho, lv 9/2024  - urine 12/2023     #GI: H/o H. Pylori infxn per pt report, Dyspepsia  - relief w/ pepcid     #Heme: Anemia, Thrombocytosis  - est w/ Dr. Coker / Dr. Leal,  11/2019  - takes iron - constipates; rec to take 3xwkly     #Neuro: Memory changes  - est w/ Neuropsych / Dr. Killian, lv 8/2021     #Ophtho: Glaucoma  - est w/ Dr. Grace,  9/2024     #Social:  - goes to [facility]; checks bp there, on avg 140s/80s    Review of Systems   Constitutional:  Negative for activity change and unexpected weight change.   HENT:  Negative for hearing loss, rhinorrhea and trouble swallowing.    Eyes:  Negative for discharge and visual disturbance.   Respiratory:  Negative for chest tightness and wheezing.    Cardiovascular:  Negative for chest pain and palpitations.   Gastrointestinal:  Negative for blood in stool, constipation, diarrhea and vomiting.   Endocrine: Negative for polydipsia and polyuria.   Genitourinary:  Negative for difficulty urinating, hematuria and urgency.   Musculoskeletal:  Negative for arthralgias, joint swelling and neck pain.   Neurological:  Negative for weakness and headaches.   Psychiatric/Behavioral:  Negative for confusion and dysphoric mood.          Past Medical History:   Diagnosis Date    Alcohol dependence     Diabetes mellitus type II     Diabetic polyneuropathy associated with type 2 diabetes mellitus 3/21/2016    Diverticulosis of colon 2014    Glaucoma     Hyperlipidemia     Hypertension     Stage 2 chronic kidney disease 10/11/2018    Stage 2 chronic kidney disease due to type 2 diabetes mellitus     Type 2  "diabetes mellitus with ophthalmic manifestations         Prior to Admission medications    Medication Sig Start Date End Date Taking? Authorizing Provider   ascorbic acid (VITAMIN C) 500 MG tablet Take 500 mg by mouth once daily.    Provider, Historical   aspirin (ECOTRIN) 81 MG EC tablet Take 81 mg by mouth once daily.    Provider, Historical   CALCIUM CARBONATE/VITAMIN D3 (CALCIUM 500 + D ORAL) Take 1 tablet by mouth once daily.    Provider, Historical   cyanocobalamin (VITAMIN B-12) 100 MCG tablet Take 100 mcg by mouth once daily.    Provider, Historical   fish oil-omega-3 fatty acids 300-1,000 mg capsule Take 1 capsule by mouth once daily.     Provider, Historical   latanoprost 0.005 % ophthalmic solution Place 1 drop into both eyes every evening. 4/11/24   Lona Grace MD   MULTIVIT-MIN/FA/LYCOPENE/LUT (CENTRUM SILVER ULTRA MEN'S ORAL) Take 1 tablet by mouth once daily.    Provider, Historical   potassium 99 mg Tab Take 1 tablet by mouth once daily. Using OTC potassium    Provider, Historical   pravastatin (PRAVACHOL) 40 MG tablet TAKE 1 TABLET BY MOUTH EVERY DAY 5/1/21   Naresh Verdin MD   vitamin E 100 UNIT capsule Take 100 Units by mouth once daily.    Provider, Historical        Past medical history, surgical history, and family medical history reviewed and updated as appropriate.    Medications and allergies reviewed.     Objective:          Vitals:    10/16/24 1055   BP: 132/78   BP Location: Left arm   Patient Position: Sitting   Pulse: 70   SpO2: 100%   Weight: 67.7 kg (149 lb 4 oz)   Height: 5' 6" (1.676 m)     Body mass index is 24.09 kg/m².  Physical Exam  Vitals and nursing note reviewed.   Constitutional:       General: He is not in acute distress.     Appearance: Normal appearance. He is well-developed.   Eyes:      Extraocular Movements: Extraocular movements intact.   Cardiovascular:      Rate and Rhythm: Normal rate and regular rhythm.      Pulses: Normal pulses.      Heart sounds: " Normal heart sounds. No murmur heard.  Pulmonary:      Effort: Pulmonary effort is normal. No respiratory distress.      Breath sounds: Normal breath sounds. No wheezing.   Neurological:      Mental Status: He is alert and oriented to person, place, and time.   Psychiatric:         Mood and Affect: Mood normal.         Behavior: Behavior normal.         Lab Results   Component Value Date    WBC 7.37 12/21/2023    HGB 13.9 (L) 12/21/2023    HCT 41.7 12/21/2023     12/21/2023    CHOL 200 (H) 12/21/2023    TRIG 114 12/21/2023    HDL 88 (H) 12/21/2023    ALT 20 12/21/2023    AST 26 12/21/2023     (L) 12/21/2023    K 4.1 12/21/2023    CL 99 12/21/2023    CREATININE 0.9 12/21/2023    BUN 8 12/21/2023    CO2 22 (L) 12/21/2023    TSH 2.608 12/21/2023    PSA 8.2 (H) 12/21/2023    HGBA1C 5.2 12/21/2023       Assessment:       1. Annual physical exam    2. Drug therapy    3. Prostate cancer screening    4. Hyperlipidemia associated with type 2 diabetes mellitus    5. Elevated PSA, less than 10 ng/ml    6. Essential hypertension          Plan:   1. Annual physical exam  -     Comprehensive Metabolic Panel; Future; Expected date: 10/16/2024  -     CBC Without Differential; Future; Expected date: 10/16/2024  -     Lipid Panel; Future; Expected date: 10/16/2024  -     Hemoglobin A1C; Future; Expected date: 10/16/2024  -     Microalbumin/Creatinine Ratio, Urine; Future; Expected date: 10/16/2024    2. Drug therapy  -     Comprehensive Metabolic Panel; Future; Expected date: 10/16/2024  -     CBC Without Differential; Future; Expected date: 10/16/2024  -     Lipid Panel; Future; Expected date: 10/16/2024  -     Hemoglobin A1C; Future; Expected date: 10/16/2024  -     Microalbumin/Creatinine Ratio, Urine; Future; Expected date: 10/16/2024    3. Prostate cancer screening  -     Prostate Specific Antigen, Diagnostic; Future; Expected date: 10/16/2024    4. Hyperlipidemia associated with type 2 diabetes  mellitus  Overview:  rec statin regularly      5. Elevated PSA, less than 10 ng/ml  -     Prostate Specific Antigen, Diagnostic; Future; Expected date: 10/16/2024    6. Essential hypertension        Health maintenance reviewed with patient.     As this patient's primary care physician, I am actively managing and/or treating his/her chronic medical conditions now and in the future. This includes, but is not limited to, medication management, coordination of care, documentation review from his/her specialists, and labs/imaging review that I have performed to prepare for this visit as well as will do so in the future as part of my care continuity for this patient.      Follow up in about 1 year (around 10/16/2025).    Randal Spence MD  Family Medicine / Primary Care  [facility]  10/16/2024     36.6

## 2025-02-14 DIAGNOSIS — H40.1233 LOW-TENSION GLAUCOMA OF BOTH EYES, SEVERE STAGE: ICD-10-CM

## 2025-02-15 RX ORDER — LATANOPROST 50 UG/ML
1 SOLUTION/ DROPS OPHTHALMIC NIGHTLY
Qty: 7.5 ML | Refills: 3 | Status: SHIPPED | OUTPATIENT
Start: 2025-02-15

## 2025-02-22 DIAGNOSIS — Z00.00 ENCOUNTER FOR MEDICARE ANNUAL WELLNESS EXAM: ICD-10-CM

## 2025-03-26 ENCOUNTER — OFFICE VISIT (OUTPATIENT)
Dept: INTERNAL MEDICINE | Facility: CLINIC | Age: 80
End: 2025-03-26
Payer: MEDICARE

## 2025-03-26 VITALS
OXYGEN SATURATION: 98 % | WEIGHT: 143.5 LBS | BODY MASS INDEX: 23.06 KG/M2 | DIASTOLIC BLOOD PRESSURE: 72 MMHG | TEMPERATURE: 99 F | HEART RATE: 79 BPM | HEIGHT: 66 IN | SYSTOLIC BLOOD PRESSURE: 130 MMHG

## 2025-03-26 DIAGNOSIS — R06.6 HICCUPS: Primary | ICD-10-CM

## 2025-03-26 DIAGNOSIS — E11.29 TYPE 2 DIABETES MELLITUS WITH DIABETIC MICROALBUMINURIA, WITHOUT LONG-TERM CURRENT USE OF INSULIN: ICD-10-CM

## 2025-03-26 DIAGNOSIS — E78.5 HYPERLIPIDEMIA ASSOCIATED WITH TYPE 2 DIABETES MELLITUS: ICD-10-CM

## 2025-03-26 DIAGNOSIS — I10 ESSENTIAL HYPERTENSION: ICD-10-CM

## 2025-03-26 DIAGNOSIS — R80.9 TYPE 2 DIABETES MELLITUS WITH DIABETIC MICROALBUMINURIA, WITHOUT LONG-TERM CURRENT USE OF INSULIN: ICD-10-CM

## 2025-03-26 DIAGNOSIS — Z79.899 DRUG THERAPY: ICD-10-CM

## 2025-03-26 DIAGNOSIS — E11.69 HYPERLIPIDEMIA ASSOCIATED WITH TYPE 2 DIABETES MELLITUS: ICD-10-CM

## 2025-03-26 DIAGNOSIS — R21 RASH: ICD-10-CM

## 2025-03-26 PROCEDURE — 3078F DIAST BP <80 MM HG: CPT | Mod: HCNC,CPTII,S$GLB, | Performed by: FAMILY MEDICINE

## 2025-03-26 PROCEDURE — 1159F MED LIST DOCD IN RCRD: CPT | Mod: HCNC,CPTII,S$GLB, | Performed by: FAMILY MEDICINE

## 2025-03-26 PROCEDURE — 3075F SYST BP GE 130 - 139MM HG: CPT | Mod: HCNC,CPTII,S$GLB, | Performed by: FAMILY MEDICINE

## 2025-03-26 PROCEDURE — 99999 PR PBB SHADOW E&M-EST. PATIENT-LVL V: CPT | Mod: PBBFAC,HCNC,, | Performed by: FAMILY MEDICINE

## 2025-03-26 PROCEDURE — 99214 OFFICE O/P EST MOD 30 MIN: CPT | Mod: HCNC,S$GLB,, | Performed by: FAMILY MEDICINE

## 2025-03-26 PROCEDURE — 1160F RVW MEDS BY RX/DR IN RCRD: CPT | Mod: HCNC,CPTII,S$GLB, | Performed by: FAMILY MEDICINE

## 2025-03-26 PROCEDURE — 3288F FALL RISK ASSESSMENT DOCD: CPT | Mod: HCNC,CPTII,S$GLB, | Performed by: FAMILY MEDICINE

## 2025-03-26 PROCEDURE — G2211 COMPLEX E/M VISIT ADD ON: HCPCS | Mod: HCNC,S$GLB,, | Performed by: FAMILY MEDICINE

## 2025-03-26 PROCEDURE — 1101F PT FALLS ASSESS-DOCD LE1/YR: CPT | Mod: HCNC,CPTII,S$GLB, | Performed by: FAMILY MEDICINE

## 2025-03-26 PROCEDURE — 1126F AMNT PAIN NOTED NONE PRSNT: CPT | Mod: HCNC,CPTII,S$GLB, | Performed by: FAMILY MEDICINE

## 2025-03-26 RX ORDER — BACLOFEN 10 MG/1
10 TABLET ORAL 3 TIMES DAILY
Qty: 30 TABLET | Refills: 0 | Status: SHIPPED | OUTPATIENT
Start: 2025-03-26 | End: 2025-04-05

## 2025-03-26 NOTE — PROGRESS NOTES
Subjective:       Patient ID: Yong Harmon is a 79 y.o. male.    Chief Complaint: Annual Exam    Rash  This is a new problem. The current episode started in the past 7 days. The problem has been waxing and waning since onset. The rash is diffuse. The rash is characterized by redness. He was exposed to nothing. Associated symptoms include anorexia and fatigue. Past treatments include anti-itch cream. The treatment provided no relief. His past medical history is significant for allergies.       Yong Harmon is a 79 y.o. male for checkup.     Hiccups since last week    Rash arms and legs. Pt describes as itchy and red but gradually improving and doesn't bother him currently  Denies any new meds, supplements, foods, exposures.      #Cards: HTN, HLD  - reg: Pravastatin 40 qd  - not currently taking anti-bp regimen, h/o hypotension w/ meds  - checks bp at home     #Endo: DM (A1c 10/2024 = 5.0) w/ microalb  - eye exam: est w/ ophtho,  12/2024  - urine 10/2024     #GI: H/o H. Pylori infxn per pt report, Dyspepsia  - relief w/ pepcid     #Heme: Anemia, Thrombocytosis  - est w/ Dr. Coker / Dr. Leal,  11/2019  - takes iron - constipates; rec to take 3xwkly     #Neuro: Memory changes  - est w/ Neuropsych / Dr. Killian,  8/2021     #Ophtho: Glaucoma  - est w/ Dr. Grace,  12/2024     #Social:  - goes to [facility]; checks bp there, on avg 140s/80s    Review of Systems   Constitutional:  Positive for fatigue.   Gastrointestinal:  Positive for anorexia.   Skin:  Positive for rash.         Past Medical History:   Diagnosis Date    Alcohol dependence     Diabetes mellitus type II     Diabetic polyneuropathy associated with type 2 diabetes mellitus 03/21/2016    Diverticulosis of colon 2014    Glaucoma     Hyperlipidemia     Hypertension     Stage 2 chronic kidney disease 10/11/2018    Stage 2 chronic kidney disease due to type 2 diabetes mellitus     Type 2 diabetes mellitus with diabetic  "microalbuminuria, without long-term current use of insulin 7/23/2021    Questionable history, maybe diagnosed many years ago, a1c well controlled, not curr on meds for glucose control and healthy lifestyle encouraged, rec statin regularly.    Type 2 diabetes mellitus with ophthalmic manifestations         Prior to Admission medications    Medication Sig Start Date End Date Taking? Authorizing Provider   ascorbic acid (VITAMIN C) 500 MG tablet Take 500 mg by mouth once daily.   Yes Provider, Historical   CALCIUM CARBONATE/VITAMIN D3 (CALCIUM 500 + D ORAL) Take 1 tablet by mouth once daily.   Yes Provider, Historical   cyanocobalamin (VITAMIN B-12) 100 MCG tablet Take 100 mcg by mouth once daily.   Yes Provider, Historical   fish oil-omega-3 fatty acids 300-1,000 mg capsule Take 1 capsule by mouth once daily.   Yes Provider, Historical   latanoprost 0.005 % ophthalmic solution Place 1 drop into both eyes every evening. 2/15/25  Yes Lona Grace MD   MULTIVIT-MIN/FA/LYCOPENE/LUT (CENTRUM SILVER ULTRA MEN'S ORAL) Take 1 tablet by mouth once daily.   Yes Provider, Historical   potassium 99 mg Tab Take 1 tablet by mouth once daily. Using OTC potassium   Yes Provider, Historical   vitamin E 100 UNIT capsule Take 100 Units by mouth once daily.   Yes Provider, Historical        Past medical history, surgical history, and family medical history reviewed and updated as appropriate.    Medications and allergies reviewed.     Objective:          Vitals:    03/26/25 1319   BP: 130/72   BP Location: Left arm   Patient Position: Sitting   Pulse: 79   Temp: 99.2 °F (37.3 °C)   TempSrc: Oral   SpO2: 98%   Weight: 65.1 kg (143 lb 8.3 oz)   Height: 5' 6" (1.676 m)     Body mass index is 23.16 kg/m².  Physical Exam  Vitals and nursing note reviewed.   Constitutional:       General: He is not in acute distress.     Appearance: Normal appearance. He is well-developed.   Eyes:      Extraocular Movements: Extraocular movements " intact.   Cardiovascular:      Rate and Rhythm: Normal rate and regular rhythm.      Pulses: Normal pulses.      Heart sounds: Normal heart sounds. No murmur heard.  Pulmonary:      Effort: Pulmonary effort is normal. No respiratory distress.      Breath sounds: Normal breath sounds. No wheezing.   Neurological:      Mental Status: He is alert and oriented to person, place, and time.   Psychiatric:         Mood and Affect: Mood normal.         Behavior: Behavior normal.         Lab Results   Component Value Date    WBC 7.37 10/16/2024    HGB 15.0 10/16/2024    HCT 44.7 10/16/2024     10/16/2024    CHOL 212 (H) 10/16/2024    TRIG 57 10/16/2024    HDL 96 (H) 10/16/2024    ALT 19 10/16/2024    AST 25 10/16/2024     (L) 10/16/2024    K 4.1 10/16/2024    CL 97 10/16/2024    CREATININE 0.8 10/16/2024    BUN 8 10/16/2024    CO2 26 10/16/2024    TSH 2.608 12/21/2023    PSA 8.2 (H) 12/21/2023    HGBA1C 5.0 10/16/2024       Assessment:       1. Hiccups    2. Hyperlipidemia associated with type 2 diabetes mellitus    3. Drug therapy    4. Type 2 diabetes mellitus with diabetic microalbuminuria, without long-term current use of insulin    5. Essential hypertension    6. Rash          Plan:   1. Hiccups    2. Hyperlipidemia associated with type 2 diabetes mellitus  Overview:  rec statin regularly.      3. Drug therapy    4. Type 2 diabetes mellitus with diabetic microalbuminuria, without long-term current use of insulin  Overview:  Questionable history, maybe diagnosed many years ago, a1c well controlled, not curr on meds for glucose control and healthy lifestyle encouraged, rec statin regularly.      5. Essential hypertension    6. Rash  -     Ambulatory referral/consult to Dermatology; Future; Expected date: 03/26/2025    Other orders  -     baclofen (LIORESAL) 10 MG tablet; Take 1 tablet (10 mg total) by mouth 3 (three) times daily. for 10 days  Dispense: 30 tablet; Refill: 0        Follow up should symptoms  persist or worsen. If symptoms become severe, please report immediately to urgent care or emergency room for further evaluation. Patient voiced understanding.      Health maintenance reviewed with patient.     Follow up in about 6 months (around 9/26/2025) for Annual.    As this patient's primary care physician, I am actively managing and/or treating his/her chronic medical conditions now and in the future. This includes, but is not limited to, medication management, coordination of care, documentation review from his/her specialists, and labs/imaging review that I have performed to prepare for this visit as well as will do so in the future as part of my care continuity for this patient.    Randal Spence MD  Family Medicine / Primary Care  [facility]  3/26/2025

## 2025-04-10 NOTE — PROGRESS NOTES
HPI    DLS: 12/12/2024    Pt here for HVF review/OCT;  Pt states no eye pain but just feels like he can't see. Pt states he needs   refills on his Latanoprost eye drops.     Meds;  Latanoprost QHS OU    1. Severe LTG  OU   2. VF Defects  OU   3. Pterygium OS   4. Yag Cap OU   5. PCIOL OU   6. Type 2 DM no DR       Last edited by Donna Matson MA on 4/14/2025 12:00 PM.            Assessment /Plan     For exam results, see Encounter Report.    Low-tension glaucoma of both eyes, severe stage  -     Suarez Visual Field - OU - Extended - Both Eyes  -     Posterior Segment OCT Optic Nerve- Both eyes    Scotoma, central, bilateral    Pterygium of both eyes - Both Eyes    Diabetes mellitus type 2 without retinopathy    Pseudophakia - Both Eyes    Status post YAG capsulotomy of both eyes        F/U SLT OS ( 8/17/2023) (NONE  NEEDED OD to DATE)     LOST TO F/U 1/2014 TO 1/2016 - 2 YEARS     Low Tension Glaucoma - severe stage ou  Begun on gtts 1999  First [id number]  First photos 1999           Family history    neg        Glaucoma meds    Latanoprost ou - ( use to use trusopt ou bid and alphagan)        H/O adverse rxn to glaucoma drops    Intol to timolol - heart palpitations // intol rocklatan - eyelid irritation         LASERS    ALT OD 9/15/2003 // yag cap os 3/22/2018 // SLT os 8/17/2023 - good resp 19--> 16 // yag cap od 10/31/2024 - VA improved w/ Yag         GLAUCOMA SURGERIES    none        OTHER EYE SURGERIES    PC IOL od 1/28/2009 // os 11/6/2009        CDR    0.8 w/ inf pit / 0.7 w/ inf notch (h/o sup disc heme os 8/22/2008)        Tbase    16-20 ou          Tmax    20/20            Ttarget    14/14             HVF    19  test 2000 to 2025 OD: sup alt defect, split fixation; OS: SAD with split fixation (? prog os )         Gonio    +3 ou        CCT    553/555        OCT   4   tests  2020 to 2025 - OD:dec G/TI,   // OS:dec G/T, bortenzin T/NI        HRT    7 test 2004 to 2020 - MR -  Dec. S/I od // dec. I, hannah jalloh  /// CDR 0.75 od // 0.73 os        Disc photos    1999, 1005 - slides // 2008, 210, 2012, 2016, 2019   - OIS    - Ttoday 14/14   (- target is 14 ou (long H/O poor compliance with drops )   - Test done today  IOP // HVF / DFE / OCT     2.  VF defects arcuate w/ split fixation ou - 2/2 #1    3.  PCO ou - Vis sign os    + decrease in VA with BAT ou -    S/P yag cap os 3/22/2018    Can have yag cap od prn - cont to monitor- central vis axis still clear      4.  PC IOL OU       OD 1/28/2009        OS 11/6/2008 - s/p yag cap 3/22/2018 - good response    5. Pterygium OS - causing  a FB sensation - AT's prn    Also small one od - ?? Previous excision     6. DM- no DR on exam    7. H/O malaria - post [place] - decades ago at age 23   Recovered - used hydroxychloroquine a long time ago     PLAN   target - 14/14  ou   Just at target ou (5/2024)  Did Not tolerating rocklatan well - - STAY OFF    Cont  to latanoprost  ou     + PCO OD   - can have yag cap prn     cont AT's  PRN    Pt is a  candidate for durysta prn     -  also consider durysta   Vs  Brimonidine vs -   trial of dorzolamide     SLT os - done 8/17/2023 -  good restp 19--> 16 os   No need for slt od at this time     F/u in  4 months with IOP /      I have seen and personally examined the patient.  I agree with the findings, assessment and plan of the resident and/or fellow.     Lona Grace MD

## 2025-04-14 ENCOUNTER — OFFICE VISIT (OUTPATIENT)
Dept: OPHTHALMOLOGY | Facility: CLINIC | Age: 80
End: 2025-04-14
Payer: MEDICARE

## 2025-04-14 ENCOUNTER — CLINICAL SUPPORT (OUTPATIENT)
Dept: OPHTHALMOLOGY | Facility: CLINIC | Age: 80
End: 2025-04-14
Payer: MEDICARE

## 2025-04-14 DIAGNOSIS — E11.9 DIABETES MELLITUS TYPE 2 WITHOUT RETINOPATHY: ICD-10-CM

## 2025-04-14 DIAGNOSIS — Z98.890 STATUS POST YAG CAPSULOTOMY OF BOTH EYES: ICD-10-CM

## 2025-04-14 DIAGNOSIS — Z96.1 PSEUDOPHAKIA: ICD-10-CM

## 2025-04-14 DIAGNOSIS — H53.413 SCOTOMA, CENTRAL, BILATERAL: ICD-10-CM

## 2025-04-14 DIAGNOSIS — H40.1233 LOW-TENSION GLAUCOMA OF BOTH EYES, SEVERE STAGE: Primary | ICD-10-CM

## 2025-04-14 DIAGNOSIS — H11.003 PTERYGIUM OF BOTH EYES: ICD-10-CM

## 2025-04-14 PROCEDURE — 99214 OFFICE O/P EST MOD 30 MIN: CPT | Mod: HCNC,S$GLB,, | Performed by: OPHTHALMOLOGY

## 2025-04-14 PROCEDURE — 92133 CPTRZD OPH DX IMG PST SGM ON: CPT | Mod: HCNC,S$GLB,, | Performed by: OPHTHALMOLOGY

## 2025-04-14 PROCEDURE — 1101F PT FALLS ASSESS-DOCD LE1/YR: CPT | Mod: HCNC,CPTII,S$GLB, | Performed by: OPHTHALMOLOGY

## 2025-04-14 PROCEDURE — 2023F DILAT RTA XM W/O RTNOPTHY: CPT | Mod: HCNC,CPTII,S$GLB, | Performed by: OPHTHALMOLOGY

## 2025-04-14 PROCEDURE — 1160F RVW MEDS BY RX/DR IN RCRD: CPT | Mod: HCNC,CPTII,S$GLB, | Performed by: OPHTHALMOLOGY

## 2025-04-14 PROCEDURE — 3288F FALL RISK ASSESSMENT DOCD: CPT | Mod: HCNC,CPTII,S$GLB, | Performed by: OPHTHALMOLOGY

## 2025-04-14 PROCEDURE — 99999 PR PBB SHADOW E&M-EST. PATIENT-LVL II: CPT | Mod: PBBFAC,HCNC,, | Performed by: OPHTHALMOLOGY

## 2025-04-14 PROCEDURE — 1159F MED LIST DOCD IN RCRD: CPT | Mod: HCNC,CPTII,S$GLB, | Performed by: OPHTHALMOLOGY

## 2025-04-14 PROCEDURE — 92083 EXTENDED VISUAL FIELD XM: CPT | Mod: HCNC,S$GLB,, | Performed by: OPHTHALMOLOGY

## 2025-04-14 RX ORDER — LATANOPROST 50 UG/ML
1 SOLUTION/ DROPS OPHTHALMIC NIGHTLY
Qty: 7.5 ML | Refills: 3 | Status: SHIPPED | OUTPATIENT
Start: 2025-04-14

## 2025-07-08 ENCOUNTER — PATIENT MESSAGE (OUTPATIENT)
Dept: INTERNAL MEDICINE | Facility: CLINIC | Age: 80
End: 2025-07-08

## 2025-07-08 ENCOUNTER — OFFICE VISIT (OUTPATIENT)
Dept: INTERNAL MEDICINE | Facility: CLINIC | Age: 80
End: 2025-07-08
Payer: MEDICARE

## 2025-07-08 ENCOUNTER — LAB VISIT (OUTPATIENT)
Dept: LAB | Facility: HOSPITAL | Age: 80
End: 2025-07-08
Payer: MEDICARE

## 2025-07-08 VITALS
BODY MASS INDEX: 22.53 KG/M2 | OXYGEN SATURATION: 98 % | HEART RATE: 89 BPM | HEIGHT: 66 IN | DIASTOLIC BLOOD PRESSURE: 88 MMHG | SYSTOLIC BLOOD PRESSURE: 158 MMHG | WEIGHT: 140.19 LBS

## 2025-07-08 DIAGNOSIS — R80.9 TYPE 2 DIABETES MELLITUS WITH DIABETIC MICROALBUMINURIA, WITHOUT LONG-TERM CURRENT USE OF INSULIN: ICD-10-CM

## 2025-07-08 DIAGNOSIS — I10 ESSENTIAL HYPERTENSION: ICD-10-CM

## 2025-07-08 DIAGNOSIS — E11.29 TYPE 2 DIABETES MELLITUS WITH DIABETIC MICROALBUMINURIA, WITHOUT LONG-TERM CURRENT USE OF INSULIN: ICD-10-CM

## 2025-07-08 DIAGNOSIS — Z00.00 ENCOUNTER FOR MEDICARE ANNUAL WELLNESS EXAM: Primary | ICD-10-CM

## 2025-07-08 DIAGNOSIS — E11.22 STAGE 2 CHRONIC KIDNEY DISEASE DUE TO TYPE 2 DIABETES MELLITUS: ICD-10-CM

## 2025-07-08 DIAGNOSIS — H40.1233 LOW-TENSION GLAUCOMA OF BOTH EYES, SEVERE STAGE: ICD-10-CM

## 2025-07-08 DIAGNOSIS — R41.3 MEMORY IMPAIRMENT: ICD-10-CM

## 2025-07-08 DIAGNOSIS — H91.93 BILATERAL HEARING LOSS, UNSPECIFIED HEARING LOSS TYPE: ICD-10-CM

## 2025-07-08 DIAGNOSIS — N18.2 STAGE 2 CHRONIC KIDNEY DISEASE DUE TO TYPE 2 DIABETES MELLITUS: ICD-10-CM

## 2025-07-08 LAB
EAG (OHS): 97 MG/DL (ref 68–131)
HBA1C MFR BLD: 5 % (ref 4–5.6)

## 2025-07-08 PROCEDURE — 3288F FALL RISK ASSESSMENT DOCD: CPT | Mod: CPTII,HCNC,S$GLB, | Performed by: PHYSICIAN ASSISTANT

## 2025-07-08 PROCEDURE — 1158F ADVNC CARE PLAN TLK DOCD: CPT | Mod: CPTII,HCNC,S$GLB, | Performed by: PHYSICIAN ASSISTANT

## 2025-07-08 PROCEDURE — 1160F RVW MEDS BY RX/DR IN RCRD: CPT | Mod: CPTII,HCNC,S$GLB, | Performed by: PHYSICIAN ASSISTANT

## 2025-07-08 PROCEDURE — 1125F AMNT PAIN NOTED PAIN PRSNT: CPT | Mod: CPTII,HCNC,S$GLB, | Performed by: PHYSICIAN ASSISTANT

## 2025-07-08 PROCEDURE — 36415 COLL VENOUS BLD VENIPUNCTURE: CPT | Mod: HCNC

## 2025-07-08 PROCEDURE — 3079F DIAST BP 80-89 MM HG: CPT | Mod: CPTII,HCNC,S$GLB, | Performed by: PHYSICIAN ASSISTANT

## 2025-07-08 PROCEDURE — 83036 HEMOGLOBIN GLYCOSYLATED A1C: CPT | Mod: HCNC

## 2025-07-08 PROCEDURE — 1159F MED LIST DOCD IN RCRD: CPT | Mod: CPTII,HCNC,S$GLB, | Performed by: PHYSICIAN ASSISTANT

## 2025-07-08 PROCEDURE — 99999 PR PBB SHADOW E&M-EST. PATIENT-LVL V: CPT | Mod: PBBFAC,HCNC,, | Performed by: PHYSICIAN ASSISTANT

## 2025-07-08 PROCEDURE — 3077F SYST BP >= 140 MM HG: CPT | Mod: CPTII,HCNC,S$GLB, | Performed by: PHYSICIAN ASSISTANT

## 2025-07-08 PROCEDURE — 1101F PT FALLS ASSESS-DOCD LE1/YR: CPT | Mod: CPTII,HCNC,S$GLB, | Performed by: PHYSICIAN ASSISTANT

## 2025-07-08 PROCEDURE — 1170F FXNL STATUS ASSESSED: CPT | Mod: CPTII,HCNC,S$GLB, | Performed by: PHYSICIAN ASSISTANT

## 2025-07-08 PROCEDURE — G0439 PPPS, SUBSEQ VISIT: HCPCS | Mod: HCNC,S$GLB,, | Performed by: PHYSICIAN ASSISTANT

## 2025-07-08 NOTE — PROGRESS NOTES
"  Yong Harmon presented for a follow-up Medicare AWV today. The following components were reviewed and updated:    Medical history  Family History  Social history  Allergies and Current Medications  Health Risk Assessment  Health Maintenance  Care Team    **See Completed Assessments for Annual Wellness visit with in the encounter summary    The following assessments were completed:  Depression Screening  Cognitive function Screening (pt pauloal'd by neuropsychology in 2021 for memory impairment)  Timed Get Up Test  Whisper Test      Opioid documentation:      Patient does not have a current opioid prescription.          Vitals:    07/08/25 0918 07/08/25 0953   BP: (!) 162/88 (!) 158/88   BP Location: Right arm    Patient Position: Sitting    Pulse: 89    SpO2: 98%    Weight: 63.6 kg (140 lb 3.4 oz)    Height: 5' 6" (1.676 m)      Body mass index is 22.63 kg/m².       Physical Exam  Constitutional:       General: He is not in acute distress.     Appearance: He is not ill-appearing.   HENT:      Right Ear: Tympanic membrane, ear canal and external ear normal.      Left Ear: Tympanic membrane, ear canal and external ear normal.   Cardiovascular:      Rate and Rhythm: Normal rate and regular rhythm.   Pulmonary:      Effort: Pulmonary effort is normal. No respiratory distress.      Breath sounds: No wheezing.   Musculoskeletal:      Right lower leg: No edema.      Left lower leg: No edema.   Lymphadenopathy:      Cervical: No cervical adenopathy.   Skin:     Findings: No rash.   Neurological:      Mental Status: He is alert.   Psychiatric:         Mood and Affect: Mood normal.           Diagnoses and health risks identified today and associated recommendations/orders:  1. Encounter for Medicare annual wellness exam  Exam and Assessments performed  Chart Review Complete  Health Maintenance Reviewed and updated  - Referral to Enhanced Annual Wellness Visit (eAWV) W+1    2. Type 2 diabetes mellitus with diabetic " microalbuminuria, without long-term current use of insulin  At goal with lifestyle diet  Followed by PCP  - Hemoglobin A1C; Future    3. Stage 2 chronic kidney disease due to type 2 diabetes mellitus  Stable  Followed by PCP    4. Essential hypertension  Elevated today  Pt no longer on BP meds  Advised on monitoring and sending log in 1 week    5. Memory impairment  Stable  Eval'd by Neuropsychology  Followed by PCP    6. Low-tension glaucoma of both eyes, severe stage  Stable on current meds  Followed by OPHTHALMOLOGY     7. Bilateral hearing loss, unspecified hearing loss type  - Ambulatory referral/consult to Audiology; Future      Provided Yong with a 5-10 year written screening schedule and personal prevention plan. Recommendations were developed using the USPSTF age appropriate recommendations. Education, counseling, and referrals were provided as needed.  After Visit Summary printed and given to patient which includes a list of additional screenings\tests needed.    Follow up in about 3 months (around 9/26/2025) for PCP follow up and 1 year for next Medicare AWV.      Bernadette Johnson PA-C    I offered to discuss advanced care planning, including how to pick a person who would make decisions for you if you were unable to make them for yourself, called a health care power of , and what kind of decisions you might make such as use of life sustaining treatments such as ventilators and tube feeding when faced with a life limiting illness recorded on a living will that they will need to know. (How you want to be cared for as you near the end of your natural life)     X Patient is interested in learning more about how to make advanced directives.  I provided them paperwork and offered to discuss this with them.    Future Appointments   Date Time Provider Department Center   8/11/2025 10:30 AM Lona Grace MD [facility]   9/26/2025  1:00 PM Randal Spence MD [facility]

## 2025-07-08 NOTE — Clinical Note
Stanley Miller, thanks for checking out Mr. Harmon. I think you can cancel the appt for 10/10 with Dr. Spence, he was already scheduled with him on 9/26.

## 2025-07-08 NOTE — PATIENT INSTRUCTIONS
Counseling and Referral of Other Preventative  (Italic type indicates deductible and co-insurance are waived)    Patient Name: Yong Harmon  Today's Date: 7/8/2025    Health Maintenance       Date Due Completion Date    TETANUS VACCINE Never done ---    Shingles Vaccine (2 of 3) 05/16/2016 3/21/2016    RSV Vaccine (Age 60+ and Pregnant patients) (1 - 1-dose 75+ series) Never done ---    Hemoglobin A1c 04/16/2025 10/16/2024    Override on 10/11/2018: Done    Influenza Vaccine (1) 09/01/2025 10/16/2024 (Declined)    Override on 10/16/2024: Declined    Override on 10/5/2016: Declined    PROSTATE-SPECIFIC ANTIGEN 10/16/2025 10/16/2024    Diabetes Urine Screening 10/16/2025 10/16/2024    Lipid Panel 10/16/2025 10/16/2024    Override on 10/11/2018: Done    Diabetic Eye Exam 04/14/2026 4/14/2025    Override on 12/24/2024: Done        Orders Placed This Encounter   Procedures    Hemoglobin A1C    Ambulatory referral/consult to Audiology       The following information is provided to all patients.  This information is to help you find resources for any of the problems found today that may be affecting your health:                  Living healthy guide: www.Pending sale to [facility].[place].gov      Understanding Diabetes: www.diabetes.org      Eating healthy: www.cdc.gov/healthyweight      CDC home safety checklist: www.cdc.gov/steadi/patient.html      Agency on Aging: www.goea.[place].[facility]      Alcoholics anonymous (AA): www.aa.org      Physical Activity: www.devi.nih.gov/wv8xfcf      Tobacco use: www.quitwithusla.org

## 2025-08-11 ENCOUNTER — CLINICAL SUPPORT (OUTPATIENT)
Dept: AUDIOLOGY | Facility: CLINIC | Age: 80
End: 2025-08-11
Payer: MEDICARE

## 2025-08-11 ENCOUNTER — OFFICE VISIT (OUTPATIENT)
Dept: OPHTHALMOLOGY | Facility: CLINIC | Age: 80
End: 2025-08-11
Payer: MEDICARE

## 2025-08-11 DIAGNOSIS — H90.3 SENSORINEURAL HEARING LOSS (SNHL) OF BOTH EARS: Primary | ICD-10-CM

## 2025-08-11 DIAGNOSIS — H11.003 PTERYGIUM OF BOTH EYES: ICD-10-CM

## 2025-08-11 DIAGNOSIS — H40.1233 LOW-TENSION GLAUCOMA OF BOTH EYES, SEVERE STAGE: Primary | ICD-10-CM

## 2025-08-11 DIAGNOSIS — H93.13 TINNITUS, BILATERAL: ICD-10-CM

## 2025-08-11 DIAGNOSIS — Z96.1 PSEUDOPHAKIA: ICD-10-CM

## 2025-08-11 DIAGNOSIS — H53.413 SCOTOMA, CENTRAL, BILATERAL: ICD-10-CM

## 2025-08-11 DIAGNOSIS — Z98.890 S/P YAG CAPSULOTOMY, RIGHT: ICD-10-CM

## 2025-08-11 DIAGNOSIS — Z98.890 STATUS POST YAG CAPSULOTOMY OF BOTH EYES: ICD-10-CM

## 2025-08-11 DIAGNOSIS — E11.9 DIABETES MELLITUS TYPE 2 WITHOUT RETINOPATHY: ICD-10-CM

## 2025-08-11 PROCEDURE — 1101F PT FALLS ASSESS-DOCD LE1/YR: CPT | Mod: CPTII,HCNC,S$GLB, | Performed by: OPHTHALMOLOGY

## 2025-08-11 PROCEDURE — 3288F FALL RISK ASSESSMENT DOCD: CPT | Mod: CPTII,HCNC,S$GLB, | Performed by: OPHTHALMOLOGY

## 2025-08-11 PROCEDURE — 1126F AMNT PAIN NOTED NONE PRSNT: CPT | Mod: CPTII,HCNC,S$GLB, | Performed by: OPHTHALMOLOGY

## 2025-08-11 PROCEDURE — 92557 COMPREHENSIVE HEARING TEST: CPT | Mod: HCNC,S$GLB,,

## 2025-08-11 PROCEDURE — 1159F MED LIST DOCD IN RCRD: CPT | Mod: CPTII,HCNC,S$GLB, | Performed by: OPHTHALMOLOGY

## 2025-08-11 PROCEDURE — 1160F RVW MEDS BY RX/DR IN RCRD: CPT | Mod: CPTII,HCNC,S$GLB, | Performed by: OPHTHALMOLOGY

## 2025-08-11 PROCEDURE — 99214 OFFICE O/P EST MOD 30 MIN: CPT | Mod: HCNC,S$GLB,, | Performed by: OPHTHALMOLOGY

## 2025-08-11 PROCEDURE — 99999 PR PBB SHADOW E&M-EST. PATIENT-LVL II: CPT | Mod: PBBFAC,HCNC,,

## 2025-08-11 PROCEDURE — 92567 TYMPANOMETRY: CPT | Mod: HCNC,S$GLB,,

## 2025-08-11 PROCEDURE — 99999 PR PBB SHADOW E&M-EST. PATIENT-LVL II: CPT | Mod: PBBFAC,HCNC,, | Performed by: OPHTHALMOLOGY

## 2025-08-11 RX ORDER — BRIMONIDINE TARTRATE 2 MG/ML
1 SOLUTION/ DROPS OPHTHALMIC 3 TIMES DAILY
Qty: 30 ML | Refills: 3 | Status: SHIPPED | OUTPATIENT
Start: 2025-08-11 | End: 2026-08-11

## 2025-08-14 ENCOUNTER — PATIENT MESSAGE (OUTPATIENT)
Dept: OPHTHALMOLOGY | Facility: CLINIC | Age: 80
End: 2025-08-14
Payer: MEDICARE